# Patient Record
Sex: FEMALE | Race: WHITE | NOT HISPANIC OR LATINO | Employment: OTHER | URBAN - METROPOLITAN AREA
[De-identification: names, ages, dates, MRNs, and addresses within clinical notes are randomized per-mention and may not be internally consistent; named-entity substitution may affect disease eponyms.]

---

## 2023-03-08 PROBLEM — H69.93 DYSFUNCTION OF BOTH EUSTACHIAN TUBES: Status: ACTIVE | Noted: 2023-03-08

## 2023-03-08 PROBLEM — C18.9 COLON CANCER (MULTI): Status: ACTIVE | Noted: 2023-03-08

## 2023-03-08 PROBLEM — R53.81 MALAISE AND FATIGUE: Status: ACTIVE | Noted: 2023-03-08

## 2023-03-08 PROBLEM — M19.049 ARTHRITIS, DEGENERATIVE, LOCALIZED, PRIMARY, HAND: Status: ACTIVE | Noted: 2023-03-08

## 2023-03-08 PROBLEM — R42 DIZZINESS: Status: ACTIVE | Noted: 2023-03-08
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
## 2023-03-08 PROBLEM — M54.32 LEFT SIDED SCIATICA: Status: ACTIVE | Noted: 2023-03-08

## 2023-03-08 PROBLEM — R07.89 CHEST TIGHTNESS: Status: ACTIVE | Noted: 2023-03-08

## 2023-03-08 PROBLEM — J40 TRACHEOBRONCHITIS: Status: ACTIVE | Noted: 2023-03-08

## 2023-03-08 PROBLEM — I65.29 CAROTID STENOSIS: Status: ACTIVE | Noted: 2023-03-08

## 2023-03-08 PROBLEM — I25.10 CORONARY ARTERY DISEASE: Status: ACTIVE | Noted: 2023-03-08

## 2023-03-08 PROBLEM — R53.83 MALAISE AND FATIGUE: Status: ACTIVE | Noted: 2023-03-08

## 2023-03-08 PROBLEM — M46.1 SACROILIITIS (CMS-HCC): Status: ACTIVE | Noted: 2023-03-08

## 2023-03-08 PROBLEM — M54.16 LUMBAR RADICULITIS: Status: ACTIVE | Noted: 2023-03-08

## 2023-03-08 PROBLEM — G43.909 MIGRAINES: Status: ACTIVE | Noted: 2023-03-08

## 2023-03-08 PROBLEM — L21.9 SEBORRHEIC DERMATITIS: Status: ACTIVE | Noted: 2023-03-08

## 2023-03-08 PROBLEM — F41.0 PANIC ATTACKS: Status: ACTIVE | Noted: 2023-03-08

## 2023-03-08 PROBLEM — H91.90 HEARING DIFFICULTY: Status: ACTIVE | Noted: 2023-03-08

## 2023-03-08 PROBLEM — F03.918: Status: ACTIVE | Noted: 2023-03-08

## 2023-03-08 PROBLEM — J42 CHRONIC BRONCHITIS (MULTI): Status: ACTIVE | Noted: 2023-03-08

## 2023-03-08 PROBLEM — M51.379 DISC DEGENERATION, LUMBOSACRAL: Status: ACTIVE | Noted: 2023-03-08

## 2023-03-08 PROBLEM — F32.A DEPRESSION: Status: ACTIVE | Noted: 2023-03-08

## 2023-03-08 PROBLEM — R06.02 SHORTNESS OF BREATH: Status: ACTIVE | Noted: 2023-03-08

## 2023-03-08 PROBLEM — F03.90 DEMENTIA (MULTI): Status: ACTIVE | Noted: 2023-03-08

## 2023-03-08 PROBLEM — J02.9 PHARYNGITIS: Status: ACTIVE | Noted: 2023-03-08

## 2023-03-08 PROBLEM — F41.9 ANXIETY: Status: ACTIVE | Noted: 2023-03-08

## 2023-03-08 PROBLEM — M77.8 THUMB TENDONITIS: Status: ACTIVE | Noted: 2023-03-08

## 2023-03-08 PROBLEM — E78.5 HYPERLIPIDEMIA: Status: ACTIVE | Noted: 2023-03-08

## 2023-03-08 PROBLEM — R31.9 HEMATURIA: Status: ACTIVE | Noted: 2023-03-08

## 2023-03-08 PROBLEM — I71.40 AAA (ABDOMINAL AORTIC ANEURYSM) (CMS-HCC): Status: ACTIVE | Noted: 2023-03-08

## 2023-03-08 PROBLEM — R00.2 PALPITATIONS: Status: ACTIVE | Noted: 2023-03-08

## 2023-03-08 PROBLEM — E55.9 VITAMIN D DEFICIENCY: Status: ACTIVE | Noted: 2023-03-08

## 2023-03-08 PROBLEM — L25.5 RHUS DERMATITIS: Status: ACTIVE | Noted: 2023-03-08

## 2023-03-08 PROBLEM — S00.93XA HEAD CONTUSION: Status: ACTIVE | Noted: 2023-03-08

## 2023-03-08 PROBLEM — E03.9 HYPOTHYROIDISM: Status: ACTIVE | Noted: 2023-03-08

## 2023-03-08 PROBLEM — M51.37 DISC DEGENERATION, LUMBOSACRAL: Status: ACTIVE | Noted: 2023-03-08

## 2023-03-08 PROBLEM — R10.30 LOWER ABDOMINAL PAIN: Status: ACTIVE | Noted: 2023-03-08

## 2023-03-08 PROBLEM — K59.04 CHRONIC IDIOPATHIC CONSTIPATION: Status: ACTIVE | Noted: 2023-03-08

## 2023-03-08 PROBLEM — K50.10: Status: ACTIVE | Noted: 2023-03-08

## 2023-03-08 RX ORDER — LEVOTHYROXINE SODIUM 25 UG/1
1 TABLET ORAL
COMMUNITY
Start: 2021-11-30 | End: 2023-06-19

## 2023-03-08 RX ORDER — SUMATRIPTAN SUCCINATE 100 MG/1
100 TABLET ORAL
COMMUNITY

## 2023-03-08 RX ORDER — DONEPEZIL HYDROCHLORIDE 5 MG/1
1 TABLET, FILM COATED ORAL NIGHTLY
COMMUNITY
Start: 2022-09-06 | End: 2024-01-24 | Stop reason: SDUPTHER

## 2023-03-08 RX ORDER — ALPRAZOLAM 0.25 MG/1
0.25 TABLET ORAL DAILY PRN
COMMUNITY
Start: 2022-09-06 | End: 2023-03-09

## 2023-03-08 RX ORDER — FLUOXETINE 20 MG/1
20 TABLET ORAL DAILY
COMMUNITY
Start: 2022-09-06 | End: 2024-01-24 | Stop reason: ALTCHOICE

## 2023-03-08 RX ORDER — MEMANTINE HYDROCHLORIDE 10 MG/1
10 TABLET ORAL 2 TIMES DAILY
COMMUNITY
Start: 2022-08-15 | End: 2023-06-20 | Stop reason: ALTCHOICE

## 2023-03-08 RX ORDER — METOPROLOL SUCCINATE 50 MG/1
1 TABLET, EXTENDED RELEASE ORAL
COMMUNITY
Start: 2020-02-03 | End: 2023-03-14 | Stop reason: SDUPTHER

## 2023-03-09 DIAGNOSIS — F32.A DEPRESSION, UNSPECIFIED: ICD-10-CM

## 2023-03-09 DIAGNOSIS — F32.A ANXIETY AND DEPRESSION: Primary | ICD-10-CM

## 2023-03-09 DIAGNOSIS — F41.9 ANXIETY AND DEPRESSION: Primary | ICD-10-CM

## 2023-03-09 RX ORDER — FLUOXETINE HYDROCHLORIDE 20 MG/1
CAPSULE ORAL
Qty: 180 CAPSULE | Refills: 1 | Status: SHIPPED | OUTPATIENT
Start: 2023-03-09 | End: 2023-07-31

## 2023-03-09 RX ORDER — ALPRAZOLAM 0.25 MG/1
TABLET ORAL
Qty: 30 TABLET | Refills: 1 | Status: SHIPPED | OUTPATIENT
Start: 2023-03-09 | End: 2023-03-14 | Stop reason: SDUPTHER

## 2023-03-14 ENCOUNTER — OFFICE VISIT (OUTPATIENT)
Dept: PRIMARY CARE | Facility: CLINIC | Age: 78
End: 2023-03-14
Payer: MEDICARE

## 2023-03-14 VITALS
BODY MASS INDEX: 25.69 KG/M2 | TEMPERATURE: 98.3 F | HEART RATE: 47 BPM | DIASTOLIC BLOOD PRESSURE: 58 MMHG | WEIGHT: 145 LBS | SYSTOLIC BLOOD PRESSURE: 95 MMHG | RESPIRATION RATE: 16 BRPM | HEIGHT: 63 IN | OXYGEN SATURATION: 95 %

## 2023-03-14 DIAGNOSIS — J41.1 MUCOPURULENT CHRONIC BRONCHITIS (MULTI): Primary | ICD-10-CM

## 2023-03-14 DIAGNOSIS — Z51.81 THERAPEUTIC DRUG MONITORING: ICD-10-CM

## 2023-03-14 DIAGNOSIS — Z12.31 BREAST CANCER SCREENING BY MAMMOGRAM: ICD-10-CM

## 2023-03-14 DIAGNOSIS — F41.9 ANXIETY AND DEPRESSION: ICD-10-CM

## 2023-03-14 DIAGNOSIS — F32.A ANXIETY AND DEPRESSION: ICD-10-CM

## 2023-03-14 DIAGNOSIS — I10 PRIMARY HYPERTENSION: ICD-10-CM

## 2023-03-14 PROCEDURE — 3078F DIAST BP <80 MM HG: CPT | Performed by: FAMILY MEDICINE

## 2023-03-14 PROCEDURE — 3074F SYST BP LT 130 MM HG: CPT | Performed by: FAMILY MEDICINE

## 2023-03-14 PROCEDURE — 99214 OFFICE O/P EST MOD 30 MIN: CPT | Performed by: FAMILY MEDICINE

## 2023-03-14 RX ORDER — METOPROLOL SUCCINATE 50 MG/1
50 TABLET, EXTENDED RELEASE ORAL
Qty: 90 TABLET | Refills: 3 | Status: SHIPPED | OUTPATIENT
Start: 2023-03-14 | End: 2023-09-20 | Stop reason: ALTCHOICE

## 2023-03-14 RX ORDER — ALPRAZOLAM 0.25 MG/1
0.25 TABLET ORAL DAILY PRN
Qty: 30 TABLET | Refills: 0 | Status: SHIPPED | OUTPATIENT
Start: 2023-03-14 | End: 2023-06-19

## 2023-03-14 ASSESSMENT — PAIN SCALES - GENERAL: PAINLEVEL: 0-NO PAIN

## 2023-03-14 NOTE — PROGRESS NOTES
Subjective   Harper Diana is a 77 y.o. female who presents for Follow-up (Saw geriatric specialist at [facility] concerned about weight loss ).  HPI patient is a 77-year-old female who suffers with dementia, low blood pressure and weight loss.  She did see the geriatric psychiatrist a month ago and they wanted her to have a low-dose CT scan due to her tobacco usage and patient is also due for mammogram.  These will be ordered today.  Patient is doing better mentally and her daughter does call her twice a day to keep tabs on it.  She lives with her older brother but his health is not very good.  She also needs a refill on her medications today and will sign the drug contract and I will review the OARRS report.  She did give us a urine drug screen last September.      Objective   ROS10 systems were reviewed and the information is included in the HPI and no additional review of systems is indicated.  The    Physical Exam  Constitutional:       General: She is not in acute distress.     Appearance: Normal appearance. She is well-developed. She is not diaphoretic.   HENT:      Head: Normocephalic and atraumatic.      Right Ear: Tympanic membrane, ear canal and external ear normal.      Left Ear: Tympanic membrane, ear canal and external ear normal.      Nose: Congestion present.      Comments: Chronic sinus from tobacco       Mouth/Throat:      Mouth: Mucous membranes are dry.      Pharynx: Oropharynx is clear.   Eyes:      General: No scleral icterus.     Extraocular Movements: Extraocular movements intact.      Conjunctiva/sclera: Conjunctivae normal.      Pupils: Pupils are equal, round, and reactive to light.   Neck:      Vascular: No JVD.      Comments: Patient has mild thyromegaly chronic hypothyroidism.  She will need another ultrasound of her thyroid.  Cardiovascular:      Rate and Rhythm: Normal rate and regular rhythm.      Pulses: Normal pulses.      Heart sounds: Normal heart sounds. No murmur heard.     No  friction rub. No gallop.      Comments: Patient takes Toprol-XL 50 mg daily and this will be decreased to 25 daily due to the low blood pressure.  Pulmonary:      Effort: Pulmonary effort is normal. No respiratory distress.      Breath sounds: Rhonchi (Scattered rhonchi from tobacco use.) present. No wheezing (Expiratory wheezes.  Tobacco usage) or rales.      Comments: Patient has bilateral loose rhonchi and expiratory wheezing from tobacco use.  Chest:      Chest wall: No tenderness.   Abdominal:      General: Bowel sounds are normal. There is no distension.      Palpations: Abdomen is soft. There is no mass.      Tenderness: There is no abdominal tenderness. There is no rebound.      Comments: Patient has a gallstone from previous CT scan.  Asymptomatic.     She also has a had no monitor kidney that was evaluated many years ago.  History of treated   colon cancer   Genitourinary:     Comments: Not examined  Musculoskeletal:         General: Normal range of motion.      Cervical back: Normal range of motion and neck supple. No tenderness.   Skin:     General: Skin is warm and dry.   Neurological:      General: No focal deficit present.      Mental Status: She is alert and oriented to person, place, and time.      Deep Tendon Reflexes: Reflexes normal.   Psychiatric:         Behavior: Behavior normal.         Thought Content: Thought content normal.         Judgment: Judgment normal.      Comments: Patient has  chronic anxiety.  Needs refill on Alprazolam.  Chronic anxiety and nervousness.  Stable dementia.         Assessment/Plan   Patient was evaluated with her daughter present for her dementia, weight loss and low blood pressure.  Her Toprol will be decreased to 1/2 pill daily.  Hopefully this will help her blood pressure and she is mildly dehydrated and needs to drink more water.  Patient will give us a urine for drug screen for her alprazolam refill.  I did review her OARRS report and she did complete the  contract for benzodiazepines.  Patient otherwise was scheduled for mammograms and a low-dose lung CT.  She will follow-up in 3 months or sooner as needed.  He is also seeing the geriatric psychiatrist.  She does show some improvement and her daughter calls her twice a day to keep tabs on her.  Mental status is stable.  Problem List Items Addressed This Visit    None  Visit Diagnoses       Anxiety and depression                     Bassem Landaverde, DO

## 2023-06-19 DIAGNOSIS — R05.3 CHRONIC COUGHING: Primary | ICD-10-CM

## 2023-06-19 DIAGNOSIS — F32.A ANXIETY AND DEPRESSION: ICD-10-CM

## 2023-06-19 DIAGNOSIS — J44.9 CHRONIC OBSTRUCTIVE PULMONARY DISEASE, UNSPECIFIED COPD TYPE (MULTI): ICD-10-CM

## 2023-06-19 DIAGNOSIS — E07.9 DISORDER OF THYROID, UNSPECIFIED: ICD-10-CM

## 2023-06-19 DIAGNOSIS — F41.9 ANXIETY AND DEPRESSION: ICD-10-CM

## 2023-06-19 RX ORDER — LEVOTHYROXINE SODIUM 25 UG/1
TABLET ORAL
Qty: 90 TABLET | Refills: 1 | Status: SHIPPED | OUTPATIENT
Start: 2023-06-19 | End: 2023-08-08 | Stop reason: SDUPTHER

## 2023-06-19 RX ORDER — ALPRAZOLAM 0.25 MG/1
TABLET ORAL
Qty: 30 TABLET | Refills: 1 | Status: SHIPPED | OUTPATIENT
Start: 2023-06-19 | End: 2023-08-23

## 2023-06-20 ENCOUNTER — OFFICE VISIT (OUTPATIENT)
Dept: PRIMARY CARE | Facility: CLINIC | Age: 78
End: 2023-06-20
Payer: MEDICARE

## 2023-06-20 VITALS
SYSTOLIC BLOOD PRESSURE: 113 MMHG | HEIGHT: 63 IN | TEMPERATURE: 98.1 F | HEART RATE: 55 BPM | DIASTOLIC BLOOD PRESSURE: 72 MMHG | OXYGEN SATURATION: 98 % | RESPIRATION RATE: 14 BRPM | BODY MASS INDEX: 24.27 KG/M2 | WEIGHT: 137 LBS

## 2023-06-20 DIAGNOSIS — I63.81 MULTIPLE LACUNAR INFARCTS (MULTI): ICD-10-CM

## 2023-06-20 DIAGNOSIS — F41.9 ANXIETY: ICD-10-CM

## 2023-06-20 DIAGNOSIS — R53.83 MALAISE AND FATIGUE: ICD-10-CM

## 2023-06-20 DIAGNOSIS — E78.2 MIXED HYPERLIPIDEMIA: ICD-10-CM

## 2023-06-20 DIAGNOSIS — E03.9 ACQUIRED HYPOTHYROIDISM: ICD-10-CM

## 2023-06-20 DIAGNOSIS — F03.918 BEHAVIORAL AND PSYCHOLOGICAL SYMPTOMS OF DEMENTIA (MULTI): ICD-10-CM

## 2023-06-20 DIAGNOSIS — M54.16 LUMBAR RADICULITIS: ICD-10-CM

## 2023-06-20 DIAGNOSIS — F32.4 MAJOR DEPRESSIVE DISORDER IN PARTIAL REMISSION, UNSPECIFIED WHETHER RECURRENT (CMS-HCC): Primary | ICD-10-CM

## 2023-06-20 DIAGNOSIS — I71.40 ABDOMINAL AORTIC ANEURYSM (AAA), UNSPECIFIED PART, UNSPECIFIED WHETHER RUPTURED (CMS-HCC): ICD-10-CM

## 2023-06-20 DIAGNOSIS — R53.81 MALAISE AND FATIGUE: ICD-10-CM

## 2023-06-20 DIAGNOSIS — M46.1 SACROILIITIS (CMS-HCC): ICD-10-CM

## 2023-06-20 LAB
ALANINE AMINOTRANSFERASE (SGPT) (U/L) IN SER/PLAS: 11 U/L (ref 7–45)
ALBUMIN (G/DL) IN SER/PLAS: 4.4 G/DL (ref 3.4–5)
ALKALINE PHOSPHATASE (U/L) IN SER/PLAS: 73 U/L (ref 33–136)
ANION GAP IN SER/PLAS: 14 MMOL/L (ref 10–20)
ASPARTATE AMINOTRANSFERASE (SGOT) (U/L) IN SER/PLAS: 16 U/L (ref 9–39)
BILIRUBIN TOTAL (MG/DL) IN SER/PLAS: 0.7 MG/DL (ref 0–1.2)
CALCIUM (MG/DL) IN SER/PLAS: 9.2 MG/DL (ref 8.6–10.6)
CARBON DIOXIDE, TOTAL (MMOL/L) IN SER/PLAS: 28 MMOL/L (ref 21–32)
CHLORIDE (MMOL/L) IN SER/PLAS: 108 MMOL/L (ref 98–107)
CHOLESTEROL (MG/DL) IN SER/PLAS: 173 MG/DL (ref 0–199)
CHOLESTEROL IN HDL (MG/DL) IN SER/PLAS: 45.5 MG/DL
CHOLESTEROL/HDL RATIO: 3.8
CREATININE (MG/DL) IN SER/PLAS: 0.78 MG/DL (ref 0.5–1.05)
ERYTHROCYTE DISTRIBUTION WIDTH (RATIO) BY AUTOMATED COUNT: 12.8 % (ref 11.5–14.5)
ERYTHROCYTE MEAN CORPUSCULAR HEMOGLOBIN CONCENTRATION (G/DL) BY AUTOMATED: 31.5 G/DL (ref 32–36)
ERYTHROCYTE MEAN CORPUSCULAR VOLUME (FL) BY AUTOMATED COUNT: 108 FL (ref 80–100)
ERYTHROCYTES (10*6/UL) IN BLOOD BY AUTOMATED COUNT: 3.99 X10E12/L (ref 4–5.2)
GFR FEMALE: 78 ML/MIN/1.73M2
GLUCOSE (MG/DL) IN SER/PLAS: 85 MG/DL (ref 74–99)
HEMATOCRIT (%) IN BLOOD BY AUTOMATED COUNT: 42.9 % (ref 36–46)
HEMOGLOBIN (G/DL) IN BLOOD: 13.5 G/DL (ref 12–16)
LDL: 110 MG/DL (ref 0–99)
LEUKOCYTES (10*3/UL) IN BLOOD BY AUTOMATED COUNT: 6.1 X10E9/L (ref 4.4–11.3)
NRBC (PER 100 WBCS) BY AUTOMATED COUNT: 0 /100 WBC (ref 0–0)
PLATELETS (10*3/UL) IN BLOOD AUTOMATED COUNT: 177 X10E9/L (ref 150–450)
POTASSIUM (MMOL/L) IN SER/PLAS: 4.7 MMOL/L (ref 3.5–5.3)
PROTEIN TOTAL: 6.7 G/DL (ref 6.4–8.2)
SODIUM (MMOL/L) IN SER/PLAS: 145 MMOL/L (ref 136–145)
TRIGLYCERIDE (MG/DL) IN SER/PLAS: 90 MG/DL (ref 0–149)
UREA NITROGEN (MG/DL) IN SER/PLAS: 23 MG/DL (ref 6–23)
VLDL: 18 MG/DL (ref 0–40)

## 2023-06-20 PROCEDURE — 80061 LIPID PANEL: CPT

## 2023-06-20 PROCEDURE — 80053 COMPREHEN METABOLIC PANEL: CPT

## 2023-06-20 PROCEDURE — 99214 OFFICE O/P EST MOD 30 MIN: CPT | Performed by: FAMILY MEDICINE

## 2023-06-20 PROCEDURE — 85027 COMPLETE CBC AUTOMATED: CPT

## 2023-06-20 PROCEDURE — 1159F MED LIST DOCD IN RCRD: CPT | Performed by: FAMILY MEDICINE

## 2023-06-20 PROCEDURE — 1160F RVW MEDS BY RX/DR IN RCRD: CPT | Performed by: FAMILY MEDICINE

## 2023-06-20 PROCEDURE — 84443 ASSAY THYROID STIM HORMONE: CPT

## 2023-06-20 ASSESSMENT — PAIN SCALES - GENERAL: PAINLEVEL: 0-NO PAIN

## 2023-06-20 NOTE — PROGRESS NOTES
Subjective   Harper Diana is a 78 y.o. female who presents for Follow-up.  Weight loss  /  Dementia    HPI : Patient is a 78-year-old female who suffers with vascular dementia and recent weight loss.  She is with her daughter who is medical power of  and looks after her mother.  Patient lives with her brother however and he was recently in the hospital and nursing home for approximately 5 weeks due to cellulitis of his legs.  The patient is actually doing much better since her 1 medication Namenda had been discontinued.   Patient will have her blood work rechecked today to further investigate her weight loss.  She also is scheduled to have a CT scan in the next 2 weeks and she does continue to smoke tobacco approximately a pack per day.  Her daughter and I have encouraged her to try to quit tobacco but we have not had much luck.  Patient does also follow with a neurologist and has an appointment in a month for follow-up.      Objective  :ROS  ;10 systems were reviewed and the information is included in the HPI and no additional review of systems is indicated.    Physical Exam  Vitals and nursing note reviewed.   Constitutional:       Appearance: Normal appearance.      Comments: Patient is much more alert and oriented today since her Namenda was discontinued.  She does not seem confused but she does have episodic dementia.   HENT:      Head: Normocephalic.      Right Ear: Tympanic membrane and ear canal normal.      Left Ear: Tympanic membrane and ear canal normal.      Nose: Nose normal.      Mouth/Throat:      Mouth: Mucous membranes are moist.      Pharynx: Oropharynx is clear.      Comments: Mouth is moist, tongue is midline, no posterior pharyngeal erythema.  Eyes:      Extraocular Movements: Extraocular movements intact.      Conjunctiva/sclera: Conjunctivae normal.      Pupils: Pupils are equal, round, and reactive to light.      Comments: Patient denies any blurred or double vision and does have  her eyes checked yearly.   Neck:      Comments: Patient has restriction of motion due to spasm and arthritis.  Denies any thyromegaly or cervical adenopathy.  Cardiovascular:      Rate and Rhythm: Normal rate and regular rhythm.      Heart sounds: Normal heart sounds.      Comments: Denies any chest pain or palpitations.  Heart rhythm is stable S1 and S2 are noted and no murmurs.  Pulmonary:      Breath sounds: Rhonchi present.      Comments: Patient is a smoker and has chronic bronchitis.  She has expiratory rhonchi but no wheezing today.  Normal inspiration and expiration.  Abdominal:      General: Bowel sounds are normal.      Palpations: Abdomen is soft.      Comments: Abdomen is soft and nontender.  Patient denies any guarding or rebound tenderness.  She does have a history of previous colon cancer with resection.  Denies any flank tenderness.   Genitourinary:     Comments: Patient denies dysuria, no hematuria, no flank tenderness.  Patient does have occasional nocturia.  Musculoskeletal:         General: Tenderness present.      Cervical back: Normal range of motion.      Comments: Patient does have arthritis in her lower back lumbar region, occasionally has sciatica which is currently been stable.  Does have some peripheral radicular pain down the legs.  Also has osteoarthritis of the fingers.  DJD of the knees.  No ankle edema.   Skin:     General: Skin is warm and dry.      Comments: Patient's skin is warm and dry, no bruising or skin lesions noted.  No rashes.   Neurological:      General: No focal deficit present.      Mental Status: She is alert and oriented to person, place, and time. Mental status is at baseline.      Comments: Patient's MRI of the brain does show some lacunar infarcts.  She does have vascular dementia and occasionally has episodes of forgetfulness and depression.  She actually has been much better since the Namenda was discontinued and she is only on Aricept.  Some peripheral  radicular pain down the legs from lumbosacral disc disease.  Patient is baseline from her lacunar infarcts.  Gait is stable.   Psychiatric:         Mood and Affect: Mood normal.         Behavior: Behavior normal.         Thought Content: Thought content normal.         Judgment: Judgment normal.      Comments: Patient does vary day-to-day but today her mood is stable and she does not seem anxious.  Thought content and judgment are very good today and she is very talkative.  Behavior is stable today.  Dementia is very stable today.     PLAN  : Patient is a 78-year-old female who is in the office with her daughter for recheck on blood work and also review of medications.  Her dementia and mood are very stable today.  Patient has also lost some weight and I will review her blood results from today and further recommendations will be made tomorrow.  We had a long talk with the patient's daughter and the patient regarding her staying with the son who just got out of the hospital.  Patient states she is okay with living with the son and they have no major conflicts.  Patient will follow-up in 3 months or sooner pending the blood work results.    Problem List Items Addressed This Visit       AAA (abdominal aortic aneurysm) (CMS/HCC)    Relevant Orders    CBC    Comprehensive Metabolic Panel    Lipid Panel    Thyroid Stimulating Hormone    Anxiety    Relevant Orders    CBC    Comprehensive Metabolic Panel    Lipid Panel    Thyroid Stimulating Hormone    Behavioral and psychological symptoms of dementia (CMS/HCC)    Depression - Primary    Hyperlipidemia    Relevant Orders    CBC    Comprehensive Metabolic Panel    Lipid Panel    Thyroid Stimulating Hormone    Hypothyroidism    Relevant Orders    CBC    Comprehensive Metabolic Panel    Lipid Panel    Thyroid Stimulating Hormone    Lumbar radiculitis    Malaise and fatigue    Relevant Orders    CBC    Comprehensive Metabolic Panel    Lipid Panel    Thyroid Stimulating Hormone     Sacroiliitis (CMS/HCC)            Bassem Landaverde DO

## 2023-06-21 LAB — THYROTROPIN (MIU/L) IN SER/PLAS BY DETECTION LIMIT <= 0.05 MIU/L: 3.54 MIU/L (ref 0.44–3.98)

## 2023-06-23 ENCOUNTER — TELEPHONE (OUTPATIENT)
Dept: PRIMARY CARE | Facility: CLINIC | Age: 78
End: 2023-06-23
Payer: MEDICARE

## 2023-06-23 DIAGNOSIS — Z12.31 ENCOUNTER FOR SCREENING MAMMOGRAM FOR MALIGNANT NEOPLASM OF BREAST: ICD-10-CM

## 2023-06-23 DIAGNOSIS — Z12.39 BREAST SCREENING: ICD-10-CM

## 2023-06-23 NOTE — TELEPHONE ENCOUNTER
Patient daughter called and states just a new order for a mammogram and standard chest cat scan need to be put in so she can have these scheduled.   I put the order in for a mammogram I just need you to put the order in for the standard chest cat scan. Patient has these appointments scheduled for 06/27/23.

## 2023-06-23 NOTE — RESULT ENCOUNTER NOTE
Blood sugar, kidney and liver function are normal    cholesterol was normal at 173       triglycerides are normal at 90 ,    much better than last time     white blood cell count is normal     red blood cell count is stable.        Thyroid function is stable        blood work looks stable      keep up the good work           her CAT scan of the chest and mammogram have been ordered

## 2023-06-28 ENCOUNTER — TELEPHONE (OUTPATIENT)
Dept: PRIMARY CARE | Facility: CLINIC | Age: 78
End: 2023-06-28
Payer: MEDICARE

## 2023-06-28 NOTE — TELEPHONE ENCOUNTER
----- Message from Bassem Landaverde DO sent at 6/28/2023  8:30 AM EDT -----  The   mammograms are stable.    She can repeat in 1 year.

## 2023-06-30 ENCOUNTER — TELEPHONE (OUTPATIENT)
Dept: PRIMARY CARE | Facility: CLINIC | Age: 78
End: 2023-06-30
Payer: MEDICARE

## 2023-06-30 NOTE — TELEPHONE ENCOUNTER
----- Message from Bassem Landaverde DO sent at 6/29/2023  5:00 PM EDT -----  The CAT scan shows scarring in both lungs and emphysema from smoking.      She has approximately 3 small scattered nodules which are most likely benign and   non cancerous  she really should stop smoking   .      She could repeat the CAT scan in 1 year if needed.

## 2023-07-29 DIAGNOSIS — F32.A DEPRESSION, UNSPECIFIED: ICD-10-CM

## 2023-07-31 RX ORDER — FLUOXETINE HYDROCHLORIDE 20 MG/1
CAPSULE ORAL
Qty: 180 CAPSULE | Refills: 1 | Status: SHIPPED | OUTPATIENT
Start: 2023-07-31 | End: 2024-01-08

## 2023-08-08 DIAGNOSIS — E07.9 DISORDER OF THYROID, UNSPECIFIED: ICD-10-CM

## 2023-08-08 RX ORDER — LEVOTHYROXINE SODIUM 25 UG/1
25 TABLET ORAL DAILY
Qty: 90 TABLET | Refills: 3 | Status: SHIPPED | OUTPATIENT
Start: 2023-08-08

## 2023-08-22 DIAGNOSIS — F32.A ANXIETY AND DEPRESSION: ICD-10-CM

## 2023-08-22 DIAGNOSIS — F41.9 ANXIETY AND DEPRESSION: ICD-10-CM

## 2023-08-23 RX ORDER — ALPRAZOLAM 0.25 MG/1
TABLET ORAL
Qty: 30 TABLET | Refills: 1 | Status: SHIPPED | OUTPATIENT
Start: 2023-08-23 | End: 2023-11-10

## 2023-09-20 ENCOUNTER — OFFICE VISIT (OUTPATIENT)
Dept: PRIMARY CARE | Facility: CLINIC | Age: 78
End: 2023-09-20
Payer: MEDICARE

## 2023-09-20 VITALS
HEART RATE: 64 BPM | SYSTOLIC BLOOD PRESSURE: 141 MMHG | TEMPERATURE: 98.4 F | RESPIRATION RATE: 14 BRPM | HEIGHT: 63 IN | OXYGEN SATURATION: 94 % | DIASTOLIC BLOOD PRESSURE: 76 MMHG | BODY MASS INDEX: 26.05 KG/M2 | WEIGHT: 147 LBS

## 2023-09-20 DIAGNOSIS — F03.918 BEHAVIORAL AND PSYCHOLOGICAL SYMPTOMS OF DEMENTIA (MULTI): ICD-10-CM

## 2023-09-20 DIAGNOSIS — F41.9 ANXIETY: ICD-10-CM

## 2023-09-20 DIAGNOSIS — Z23 NEEDS FLU SHOT: ICD-10-CM

## 2023-09-20 DIAGNOSIS — F41.0 PANIC ATTACKS: ICD-10-CM

## 2023-09-20 DIAGNOSIS — E03.9 ACQUIRED HYPOTHYROIDISM: Primary | ICD-10-CM

## 2023-09-20 DIAGNOSIS — M51.37 DISC DEGENERATION, LUMBOSACRAL: ICD-10-CM

## 2023-09-20 DIAGNOSIS — F34.1 PERSISTENT DEPRESSIVE DISORDER: ICD-10-CM

## 2023-09-20 DIAGNOSIS — I63.81 MULTIPLE LACUNAR INFARCTS (MULTI): ICD-10-CM

## 2023-09-20 DIAGNOSIS — I10 PRIMARY HYPERTENSION: ICD-10-CM

## 2023-09-20 PROCEDURE — 1159F MED LIST DOCD IN RCRD: CPT | Performed by: FAMILY MEDICINE

## 2023-09-20 PROCEDURE — 90662 IIV NO PRSV INCREASED AG IM: CPT | Performed by: FAMILY MEDICINE

## 2023-09-20 PROCEDURE — 3078F DIAST BP <80 MM HG: CPT | Performed by: FAMILY MEDICINE

## 2023-09-20 PROCEDURE — 99214 OFFICE O/P EST MOD 30 MIN: CPT | Performed by: FAMILY MEDICINE

## 2023-09-20 PROCEDURE — 1126F AMNT PAIN NOTED NONE PRSNT: CPT | Performed by: FAMILY MEDICINE

## 2023-09-20 PROCEDURE — 3077F SYST BP >= 140 MM HG: CPT | Performed by: FAMILY MEDICINE

## 2023-09-20 PROCEDURE — 1160F RVW MEDS BY RX/DR IN RCRD: CPT | Performed by: FAMILY MEDICINE

## 2023-09-20 PROCEDURE — G0008 ADMIN INFLUENZA VIRUS VAC: HCPCS | Performed by: FAMILY MEDICINE

## 2023-09-20 RX ORDER — METOPROLOL SUCCINATE 25 MG/1
25 TABLET, EXTENDED RELEASE ORAL DAILY
Qty: 30 TABLET | Refills: 5 | Status: SHIPPED | OUTPATIENT
Start: 2023-09-20 | End: 2023-10-04

## 2023-09-20 ASSESSMENT — PAIN SCALES - GENERAL: PAINLEVEL: 0-NO PAIN

## 2023-09-20 NOTE — PROGRESS NOTES
Subjective   Harper Diana is a 78 y.o. female who presents for Follow-up (Follow up visit, blood pressure check).     HPI  : Patient is a 78-year-old female who was brought in by her daughter due to dementia, anxiety and depression, and recheck on blood pressure and medication review.  She is also seeing a dementia specialist at the [facility] and they restarted her Prozac 20 mg daily and also restarted her Aricept 5 mg daily.  The patient still does take an alprazolam almost once a day due to anxiety and panic but her mental status has stabilized and seems much better today.  The patient is also eating better and her weight has improved.  She has however still smoking and states she has cut down on cigarettes but cannot quit.  She has a phone appointment with the dementia doctor in 1 month and they will consider increasing her Aricept at that time.  Patient does seem very stable today and alert and oriented.  She still does however have intermittent confusion and panic attacks.      Objective  : ROS : 10 systems were reviewed and the information is included in the HPI and no additional review of systems is indicated.    Physical Exam  Vitals and nursing note reviewed.   Constitutional:       Appearance: Normal appearance.      Comments: Patient is alert and oriented x3.   No acute distress, but does have dementia and depression.   HENT:      Head: Normocephalic.      Right Ear: Tympanic membrane and external ear normal.      Left Ear: Tympanic membrane and external ear normal.      Ears:      Comments: Ears are patent bilaterally and TMs are clear.     Nose: Nose normal.      Mouth/Throat:      Mouth: Mucous membranes are moist.      Pharynx: Oropharynx is clear.      Comments: Mouth is moist, tongue is midline.  No posterior pharyngeal erythema.  Eyes:      Extraocular Movements: Extraocular movements intact.      Conjunctiva/sclera: Conjunctivae normal.      Pupils: Pupils are equal, round, and reactive  to light.      Comments: No visual disturbance, does have her eyes examined once a year.   Neck:      Comments: No carotid bruits, no thyromegaly, no cervical adenopathy.  Occasional neck spasm and restriction of motion secondary to stress and tension.  Cardiovascular:      Rate and Rhythm: Normal rate and regular rhythm.      Pulses: Normal pulses.      Heart sounds: Normal heart sounds.      Comments: Patient denies chest pain and no palpitations.  Heart rhythm is stable S1 and S2 are noted, no ectopics.  Pulmonary:      Effort: Pulmonary effort is normal.      Comments: Patient denies any coughing or wheezing.  Lungs are clear to auscultation.    Abdominal:      General: Abdomen is flat. Bowel sounds are normal.      Palpations: Abdomen is soft.      Comments: Abdomen is soft and nontender, no hepatosplenomegaly.  No flank tenderness.  No suprapubic pain.  Positive bowel sounds x4.  No abdominal guarding and no rebound tenderness.   Genitourinary:     Comments: Patient denies dysuria, no hematuria, no nocturia, denies flank pain.  Musculoskeletal:         General: Tenderness present.      Cervical back: Normal range of motion.      Comments: Patient has lumbosacral disc disease and chronic back pain with some difficulty ambulating.  Age-related arthritis in the joints.  Moderate  restriction of motion cervical and lumbar spines due to arthritis and muscle spasm.  Does ambulate without any assistive device.    Skin:     General: Skin is warm.      Coloration: Skin is pale.      Comments: There is no bruising, no erythema, no skin lesions noted, no rashes.   Neurological:      Mental Status: She is alert and oriented to person, place, and time. Mental status is at baseline.      Sensory: Sensory deficit present.      Comments: Patient does have dementia along with chronic depression and panic attacks.  She is doing much better since she is back on the Prozac and the Aricept.  She does follow with a dementia  specialist at the [facility].  No focal neurosensory deficits are noted.  She does have some paresthesias in her legs from chronic back trouble.  Coordination and gait are stable.  Normal muscle strength upper and lower extremities.   Psychiatric:         Behavior: Behavior normal.      Comments: Patient does live with her son and the daughter does live separately but helps supervise her care.  The daughter brought her in today and the patient is a little depressed but does seem to be improving with the Prozac.  Behavior is also stable with the Aricept but she does have intermittent panic attacks.  She relies on the daughter for decision making.     PLAN : Patient is a 78-year-old female with depression, anxiety and dementia.  She was reevaluated today with her daughter present.  Patient is also seeing a specialist at the [facility] who did restart her Prozac and Aricept.  Patient seems much more stable today and only takes the alprazolam as needed.  She does not need blood work today and we will check her in 4 months and recheck her blood results at that time.  Patient also is eating better and has cut down on her tobacco use.  We did review her recent CT scan of the chest and she will need to repeat that again next June.  Otherwise she is stable and will follow-up in January.   patient did previously have the Prevnar 20 last year and did receive her high-dose flu shot today.    Problem List Items Addressed This Visit       Disc degeneration, lumbosacral    Relevant Orders    Disability Placard     Other Visit Diagnoses       Needs flu shot                     Bassem Landaverde DO

## 2023-10-04 DIAGNOSIS — I10 PRIMARY HYPERTENSION: ICD-10-CM

## 2023-10-04 RX ORDER — METOPROLOL SUCCINATE 25 MG/1
25 TABLET, EXTENDED RELEASE ORAL DAILY
Qty: 90 TABLET | Refills: 2 | Status: SHIPPED | OUTPATIENT
Start: 2023-10-04 | End: 2024-06-06

## 2023-11-10 DIAGNOSIS — F32.A ANXIETY AND DEPRESSION: ICD-10-CM

## 2023-11-10 DIAGNOSIS — F41.9 ANXIETY AND DEPRESSION: ICD-10-CM

## 2023-11-10 RX ORDER — ALPRAZOLAM 0.25 MG/1
TABLET ORAL
Qty: 30 TABLET | Refills: 1 | Status: SHIPPED | OUTPATIENT
Start: 2023-11-10 | End: 2024-01-12

## 2024-01-06 DIAGNOSIS — F32.A DEPRESSION, UNSPECIFIED: ICD-10-CM

## 2024-01-08 RX ORDER — FLUOXETINE HYDROCHLORIDE 20 MG/1
CAPSULE ORAL
Qty: 180 CAPSULE | Refills: 1 | Status: SHIPPED | OUTPATIENT
Start: 2024-01-08 | End: 2024-01-24 | Stop reason: SDUPTHER

## 2024-01-11 DIAGNOSIS — F32.A ANXIETY AND DEPRESSION: ICD-10-CM

## 2024-01-11 DIAGNOSIS — F41.9 ANXIETY AND DEPRESSION: ICD-10-CM

## 2024-01-12 RX ORDER — ALPRAZOLAM 0.25 MG/1
TABLET ORAL
Qty: 30 TABLET | Refills: 1 | Status: SHIPPED | OUTPATIENT
Start: 2024-01-12 | End: 2024-04-10

## 2024-01-16 ENCOUNTER — APPOINTMENT (OUTPATIENT)
Dept: PRIMARY CARE | Facility: CLINIC | Age: 79
End: 2024-01-16
Payer: MEDICARE

## 2024-01-24 ENCOUNTER — OFFICE VISIT (OUTPATIENT)
Dept: PRIMARY CARE | Facility: CLINIC | Age: 79
End: 2024-01-24
Payer: MEDICARE

## 2024-01-24 VITALS
HEART RATE: 72 BPM | BODY MASS INDEX: 26.19 KG/M2 | WEIGHT: 147.8 LBS | DIASTOLIC BLOOD PRESSURE: 72 MMHG | OXYGEN SATURATION: 95 % | HEIGHT: 63 IN | TEMPERATURE: 97 F | RESPIRATION RATE: 14 BRPM | SYSTOLIC BLOOD PRESSURE: 126 MMHG

## 2024-01-24 DIAGNOSIS — I71.40 ABDOMINAL AORTIC ANEURYSM (AAA), UNSPECIFIED PART, UNSPECIFIED WHETHER RUPTURED (CMS-HCC): ICD-10-CM

## 2024-01-24 DIAGNOSIS — F01.B4 MODERATE VASCULAR DEMENTIA WITH ANXIETY (MULTI): ICD-10-CM

## 2024-01-24 DIAGNOSIS — E44.1 MILD PROTEIN-CALORIE MALNUTRITION (MULTI): ICD-10-CM

## 2024-01-24 DIAGNOSIS — R73.9 HYPERGLYCEMIA: ICD-10-CM

## 2024-01-24 DIAGNOSIS — G30.8: ICD-10-CM

## 2024-01-24 DIAGNOSIS — E78.2 MIXED HYPERLIPIDEMIA: ICD-10-CM

## 2024-01-24 DIAGNOSIS — F02.A4: ICD-10-CM

## 2024-01-24 DIAGNOSIS — C18.7 MALIGNANT NEOPLASM OF SIGMOID COLON (MULTI): ICD-10-CM

## 2024-01-24 DIAGNOSIS — Z00.00 ROUTINE GENERAL MEDICAL EXAMINATION AT HEALTH CARE FACILITY: ICD-10-CM

## 2024-01-24 DIAGNOSIS — M54.16 LUMBAR RADICULITIS: ICD-10-CM

## 2024-01-24 DIAGNOSIS — E55.9 VITAMIN D DEFICIENCY: ICD-10-CM

## 2024-01-24 DIAGNOSIS — F41.0 PANIC ATTACKS: ICD-10-CM

## 2024-01-24 DIAGNOSIS — F41.9 ANXIETY: ICD-10-CM

## 2024-01-24 DIAGNOSIS — J41.1 MUCOPURULENT CHRONIC BRONCHITIS (MULTI): ICD-10-CM

## 2024-01-24 DIAGNOSIS — F32.A DEPRESSION, UNSPECIFIED: ICD-10-CM

## 2024-01-24 DIAGNOSIS — I10 PRIMARY HYPERTENSION: ICD-10-CM

## 2024-01-24 DIAGNOSIS — M46.1 SACROILIITIS (CMS-HCC): ICD-10-CM

## 2024-01-24 DIAGNOSIS — K50.10 CROHN'S DISEASE OF COLON WITHOUT COMPLICATION (MULTI): ICD-10-CM

## 2024-01-24 DIAGNOSIS — E03.9 ACQUIRED HYPOTHYROIDISM: ICD-10-CM

## 2024-01-24 DIAGNOSIS — Z00.00 MEDICARE ANNUAL WELLNESS VISIT, SUBSEQUENT: Primary | ICD-10-CM

## 2024-01-24 DIAGNOSIS — F32.4 MAJOR DEPRESSIVE DISORDER IN PARTIAL REMISSION, UNSPECIFIED WHETHER RECURRENT (CMS-HCC): ICD-10-CM

## 2024-01-24 LAB
25(OH)D3 SERPL-MCNC: 33 NG/ML (ref 30–100)
ALBUMIN SERPL BCP-MCNC: 4.4 G/DL (ref 3.4–5)
ALP SERPL-CCNC: 64 U/L (ref 33–136)
ALT SERPL W P-5'-P-CCNC: 10 U/L (ref 7–45)
ANION GAP SERPL CALC-SCNC: 16 MMOL/L (ref 10–20)
AST SERPL W P-5'-P-CCNC: 16 U/L (ref 9–39)
BILIRUB SERPL-MCNC: 0.9 MG/DL (ref 0–1.2)
BUN SERPL-MCNC: 14 MG/DL (ref 6–23)
CALCIUM SERPL-MCNC: 9.4 MG/DL (ref 8.6–10.6)
CHLORIDE SERPL-SCNC: 106 MMOL/L (ref 98–107)
CHOLEST SERPL-MCNC: 175 MG/DL (ref 0–199)
CHOLESTEROL/HDL RATIO: 3.8
CO2 SERPL-SCNC: 23 MMOL/L (ref 21–32)
CREAT SERPL-MCNC: 0.89 MG/DL (ref 0.5–1.05)
EGFRCR SERPLBLD CKD-EPI 2021: 66 ML/MIN/1.73M*2
ERYTHROCYTE [DISTWIDTH] IN BLOOD BY AUTOMATED COUNT: 13.1 % (ref 11.5–14.5)
GLUCOSE SERPL-MCNC: 87 MG/DL (ref 74–99)
HCT VFR BLD AUTO: 43.1 % (ref 36–46)
HDLC SERPL-MCNC: 46.3 MG/DL
HGB BLD-MCNC: 14.6 G/DL (ref 12–16)
LDLC SERPL CALC-MCNC: 96 MG/DL
MCH RBC QN AUTO: 35.1 PG (ref 26–34)
MCHC RBC AUTO-ENTMCNC: 33.9 G/DL (ref 32–36)
MCV RBC AUTO: 104 FL (ref 80–100)
NON HDL CHOLESTEROL: 129 MG/DL (ref 0–149)
NRBC BLD-RTO: 0 /100 WBCS (ref 0–0)
PLATELET # BLD AUTO: 219 X10*3/UL (ref 150–450)
POC FINGERSTICK BLOOD GLUCOSE: 111 MG/DL (ref 70–100)
POTASSIUM SERPL-SCNC: 4.4 MMOL/L (ref 3.5–5.3)
PROT SERPL-MCNC: 6.8 G/DL (ref 6.4–8.2)
RBC # BLD AUTO: 4.16 X10*6/UL (ref 4–5.2)
SODIUM SERPL-SCNC: 141 MMOL/L (ref 136–145)
TRIGL SERPL-MCNC: 164 MG/DL (ref 0–149)
TSH SERPL-ACNC: 4.39 MIU/L (ref 0.44–3.98)
VLDL: 33 MG/DL (ref 0–40)
WBC # BLD AUTO: 9 X10*3/UL (ref 4.4–11.3)

## 2024-01-24 PROCEDURE — 3074F SYST BP LT 130 MM HG: CPT | Performed by: FAMILY MEDICINE

## 2024-01-24 PROCEDURE — 82306 VITAMIN D 25 HYDROXY: CPT

## 2024-01-24 PROCEDURE — 1159F MED LIST DOCD IN RCRD: CPT | Performed by: FAMILY MEDICINE

## 2024-01-24 PROCEDURE — 80053 COMPREHEN METABOLIC PANEL: CPT

## 2024-01-24 PROCEDURE — 1160F RVW MEDS BY RX/DR IN RCRD: CPT | Performed by: FAMILY MEDICINE

## 2024-01-24 PROCEDURE — 3078F DIAST BP <80 MM HG: CPT | Performed by: FAMILY MEDICINE

## 2024-01-24 PROCEDURE — 85027 COMPLETE CBC AUTOMATED: CPT

## 2024-01-24 PROCEDURE — 1170F FXNL STATUS ASSESSED: CPT | Performed by: FAMILY MEDICINE

## 2024-01-24 PROCEDURE — 1126F AMNT PAIN NOTED NONE PRSNT: CPT | Performed by: FAMILY MEDICINE

## 2024-01-24 PROCEDURE — 99214 OFFICE O/P EST MOD 30 MIN: CPT | Performed by: FAMILY MEDICINE

## 2024-01-24 PROCEDURE — 84443 ASSAY THYROID STIM HORMONE: CPT

## 2024-01-24 PROCEDURE — G0439 PPPS, SUBSEQ VISIT: HCPCS | Performed by: FAMILY MEDICINE

## 2024-01-24 PROCEDURE — 36415 COLL VENOUS BLD VENIPUNCTURE: CPT

## 2024-01-24 PROCEDURE — 82962 GLUCOSE BLOOD TEST: CPT | Performed by: FAMILY MEDICINE

## 2024-01-24 PROCEDURE — 80061 LIPID PANEL: CPT

## 2024-01-24 RX ORDER — DONEPEZIL HYDROCHLORIDE 5 MG/1
5 TABLET, FILM COATED ORAL NIGHTLY
Qty: 90 TABLET | Refills: 3 | Status: SHIPPED | OUTPATIENT
Start: 2024-01-24 | End: 2024-04-15 | Stop reason: SDUPTHER

## 2024-01-24 RX ORDER — FLUOXETINE HYDROCHLORIDE 20 MG/1
CAPSULE ORAL
Qty: 180 CAPSULE | Refills: 3 | Status: SHIPPED | OUTPATIENT
Start: 2024-01-24

## 2024-01-24 ASSESSMENT — ACTIVITIES OF DAILY LIVING (ADL)
DOING_HOUSEWORK: INDEPENDENT
DOING_HOUSEWORK: INDEPENDENT
MANAGING_FINANCES: INDEPENDENT
TAKING_MEDICATION: INDEPENDENT
MANAGING_FINANCES: INDEPENDENT
TAKING_MEDICATION: INDEPENDENT
BATHING: INDEPENDENT
DRESSING: INDEPENDENT
GROCERY_SHOPPING: INDEPENDENT
GROCERY_SHOPPING: INDEPENDENT

## 2024-01-24 ASSESSMENT — PATIENT HEALTH QUESTIONNAIRE - PHQ9
2. FEELING DOWN, DEPRESSED OR HOPELESS: SEVERAL DAYS
1. LITTLE INTEREST OR PLEASURE IN DOING THINGS: NOT AT ALL
SUM OF ALL RESPONSES TO PHQ9 QUESTIONS 1 AND 2: 1

## 2024-01-24 ASSESSMENT — ENCOUNTER SYMPTOMS
DEPRESSION: 1
OCCASIONAL FEELINGS OF UNSTEADINESS: 1
LOSS OF SENSATION IN FEET: 0

## 2024-01-24 ASSESSMENT — PAIN SCALES - GENERAL: PAINLEVEL: 0-NO PAIN

## 2024-01-24 NOTE — PROGRESS NOTES
"Subjective   Reason for Visit: Harper Diana is an 78 y.o. female here for a Medicare Wellness visit.     Past Medical, Surgical, and Family History reviewed and updated in chart.    Reviewed all medications by prescribing practitioner or clinical pharmacist (such as prescriptions, OTCs, herbal therapies and supplements) and documented in the medical record.    HPI  :  Patient is a 78-year-old female who is in for her Medicare wellness physical.  She is in with her daughter who does look after her but does not live with her.  The patient lives with her son who is also on disability and does try to take care of her.  She does suffer with mild to moderate dementia which is periodic and she does have associated anxiety and panic attacks.  Patient is in today for recheck on blood work, review and refill on medication and apparently the wintertime has caused her more anxiety since she is indoors more frequently than outdoors.   Patient also does follow with psychiatry.    Patient Care Team:  Bassem Landaverde DO as PCP - General  Bassem Landaverde DO as PCP - [facility] Attributed Provider     Review of Systems  : 10 systems were reviewed and the information is included in the HPI and no additional review of systems is indicated.    Objective   Vitals:  /72   Pulse 72   Temp 36.1 °C (97 °F)   Resp 14   Ht 1.6 m (5' 3\")   Wt 67 kg (147 lb 12.8 oz)   SpO2 95%   BMI 26.18 kg/m²       Physical Exam  Vitals and nursing note reviewed.   Constitutional:       Appearance: Normal appearance.      Comments: Patient is alert and oriented x3.   No acute distress but patient has somewhat anxious and depressed today due to the weather and being indoors during the wintertime.   HENT:      Head: Normocephalic.      Right Ear: Tympanic membrane and external ear normal.      Left Ear: Tympanic membrane and external ear normal.      Ears:      Comments: Ears are patent bilaterally and TMs are clear.     Nose: Congestion " present.      Comments: Chronic sinus congestion from tobacco use.     Mouth/Throat:      Mouth: Mucous membranes are moist.      Pharynx: Oropharynx is clear.      Comments: Mouth is moist, tongue is midline.  No posterior pharyngeal erythema.  Eyes:      Extraocular Movements: Extraocular movements intact.      Conjunctiva/sclera: Conjunctivae normal.      Pupils: Pupils are equal, round, and reactive to light.      Comments: No visual disturbance, does have her eyes examined once a year.   Neck:      Comments: Restriction to range of motion testing cervical spine due to arthritis and secondary muscle spasm.  No carotid bruits, no thyromegaly, no cervical adenopathy.    Cardiovascular:      Rate and Rhythm: Normal rate and regular rhythm.      Pulses: Normal pulses.      Heart sounds: Normal heart sounds.      Comments: Patient denies chest pain and no palpitations.  Heart rhythm is stable S1 and S2 are noted, no ectopics.  Pulmonary:      Effort: Pulmonary effort is normal.      Breath sounds: Rhonchi present.      Comments: Patient does have a smoker's cough and lungs have bilateral rhonchi in all lung fields from tobacco.  She has no interest in quitting but she has cut down to about a half a pack a day.  Abdominal:      General: Bowel sounds are normal.      Palpations: Abdomen is soft.      Comments: Patient does have a history of colon cancer probably 20 years ago and has had colonoscopies in the recent past and does follow with gastroenterology.  She denies any abdominal pain or guarding and no rebound tenderness.   Genitourinary:     Comments: Patient denies dysuria, no hematuria,  denies flank pain.  Occasional urgency and nocturia.  Musculoskeletal:      Cervical back: Normal range of motion.      Comments: Long history of lumbosacral disc disease and occasional sciatica.  She states her back pain has been stable recently and she has not done anything to strain it.  Age-related arthritis in the joints.   Mild restriction of motion cervical and lumbar spines due to degenerative arthritis and muscle spasm.   Skin:     General: Skin is warm and dry.      Comments: She does suffer with dry skin especially in the wintertime.  There is no bruising, no erythema, no skin lesions noted, no rashes.   Neurological:      General: No focal deficit present.      Mental Status: She is alert and oriented to person, place, and time. Mental status is at baseline.      Sensory: Sensory deficit present.      Comments: Patient does get some paresthesias and sciatica from degenerative disc disease in the lumbar spine.  Currently she is stable and ambulating without any difficulty.  No focal neurosensory deficits are noted.   Coordination and gait are stable.  Normal muscle strength upper and lower extremities.   Psychiatric:         Thought Content: Thought content normal.         Judgment: Judgment normal.      Comments: Patient does have anxiety with depression and today has more anxiety and just has not been feeling well today.  She has not been out of the house very much due to the winter weather.  \SHe does have seasonal affective disorder and looks forward to springtime.  She also follows with psychiatry.  She has to take Xanax periodically for panic and anxiety.  She also needs a renewal on her Prozac today.  She does not take Xanax every day but only as needed.  In spite of her anxiety and depression she is very talkative today and she does understand her problems.     PLAN : Patient is a 78-year-old female who is in today for a Medicare wellness exam.  She did answer the questions as presented by the medical assistant and she does have a living will and medical power of .  Patient was suffering today of anxious depression partly due to the weather and not being outdoors very often.  I did have a long discussion with the patient and her daughter and she does need to get out of the house during the wintertime and visit the  2 daughters at their homes.  She occasionally has to take alprazolam for panic or anxiety but does not take it every day.  She also needed a refill on her Prozac today.  Patient's blood sugar today was 111 since she was worried about being diabetic.  She will be notified of her other blood results in 4 days and further recommendations will be made at that time.  The daughter was agreeable that mom needs to get out of the house more and visit the 2 daughters more often.  She does have seasonal affective disorder and just getting her out of the house would help.  Patient will follow-up in 4 to 6 months or sooner as needed.  Her medications were also refilled and she will be notified of her blood results when available.          Assessment/Plan   Problem List Items Addressed This Visit       AAA (abdominal aortic aneurysm) (CMS/[facility])    Dementia (CMS/[facility])    Hyperlipidemia    Hypothyroidism    Vitamin D deficiency    Medicare annual wellness visit, subsequent    Primary hypertension     Other Visit Diagnoses       Routine general medical examination at health care facility    -  Primary

## 2024-04-09 DIAGNOSIS — F32.A ANXIETY AND DEPRESSION: ICD-10-CM

## 2024-04-09 DIAGNOSIS — F41.9 ANXIETY AND DEPRESSION: ICD-10-CM

## 2024-04-10 RX ORDER — ALPRAZOLAM 0.25 MG/1
TABLET ORAL
Qty: 30 TABLET | Refills: 1 | Status: SHIPPED | OUTPATIENT
Start: 2024-04-10

## 2024-04-15 DIAGNOSIS — F01.B4 MODERATE VASCULAR DEMENTIA WITH ANXIETY (MULTI): ICD-10-CM

## 2024-04-15 RX ORDER — DONEPEZIL HYDROCHLORIDE 5 MG/1
10 TABLET, FILM COATED ORAL NIGHTLY
Qty: 180 TABLET | Refills: 3 | Status: SHIPPED | OUTPATIENT
Start: 2024-04-15

## 2024-04-18 ENCOUNTER — OFFICE VISIT (OUTPATIENT)
Dept: PRIMARY CARE | Facility: CLINIC | Age: 79
End: 2024-04-18
Payer: MEDICARE

## 2024-04-18 VITALS
WEIGHT: 146.8 LBS | HEIGHT: 63 IN | OXYGEN SATURATION: 97 % | HEART RATE: 68 BPM | BODY MASS INDEX: 26.01 KG/M2 | DIASTOLIC BLOOD PRESSURE: 80 MMHG | SYSTOLIC BLOOD PRESSURE: 110 MMHG

## 2024-04-18 DIAGNOSIS — R35.0 URINE FREQUENCY: ICD-10-CM

## 2024-04-18 DIAGNOSIS — F03.B4 MODERATE DEMENTIA WITH ANXIETY, UNSPECIFIED DEMENTIA TYPE (MULTI): Primary | ICD-10-CM

## 2024-04-18 DIAGNOSIS — R79.89 ABNORMAL CBC: ICD-10-CM

## 2024-04-18 DIAGNOSIS — F32.4 MAJOR DEPRESSIVE DISORDER IN PARTIAL REMISSION, UNSPECIFIED WHETHER RECURRENT (CMS-HCC): ICD-10-CM

## 2024-04-18 DIAGNOSIS — M54.16 LUMBAR RADICULITIS: ICD-10-CM

## 2024-04-18 DIAGNOSIS — J41.0 SIMPLE CHRONIC BRONCHITIS (MULTI): ICD-10-CM

## 2024-04-18 DIAGNOSIS — E03.9 ACQUIRED HYPOTHYROIDISM: ICD-10-CM

## 2024-04-18 DIAGNOSIS — M51.37 DISC DEGENERATION, LUMBOSACRAL: ICD-10-CM

## 2024-04-18 DIAGNOSIS — R79.9 ABNORMAL BLOOD CHEMISTRY: ICD-10-CM

## 2024-04-18 DIAGNOSIS — E78.2 MIXED HYPERLIPIDEMIA: ICD-10-CM

## 2024-04-18 DIAGNOSIS — R73.9 HYPERGLYCEMIA: ICD-10-CM

## 2024-04-18 DIAGNOSIS — R79.89 ABNORMAL THYROID BLOOD TEST: ICD-10-CM

## 2024-04-18 DIAGNOSIS — F03.918 BEHAVIORAL AND PSYCHOLOGICAL SYMPTOMS OF DEMENTIA (MULTI): ICD-10-CM

## 2024-04-18 DIAGNOSIS — I10 PRIMARY HYPERTENSION: ICD-10-CM

## 2024-04-18 LAB
ALBUMIN SERPL BCP-MCNC: 4.4 G/DL (ref 3.4–5)
ALP SERPL-CCNC: 64 U/L (ref 33–136)
ALT SERPL W P-5'-P-CCNC: 12 U/L (ref 7–45)
ANION GAP SERPL CALC-SCNC: 17 MMOL/L (ref 10–20)
APPEARANCE UR: CLEAR
AST SERPL W P-5'-P-CCNC: 19 U/L (ref 9–39)
BILIRUB SERPL-MCNC: 0.8 MG/DL (ref 0–1.2)
BILIRUB UR QL STRIP: ABNORMAL
BUN SERPL-MCNC: 16 MG/DL (ref 6–23)
CALCIUM SERPL-MCNC: 9.4 MG/DL (ref 8.6–10.6)
CHLORIDE SERPL-SCNC: 106 MMOL/L (ref 98–107)
CO2 SERPL-SCNC: 26 MMOL/L (ref 21–32)
COLOR UR: ABNORMAL
CREAT SERPL-MCNC: 0.83 MG/DL (ref 0.5–1.05)
EGFRCR SERPLBLD CKD-EPI 2021: 72 ML/MIN/1.73M*2
ERYTHROCYTE [DISTWIDTH] IN BLOOD BY AUTOMATED COUNT: 12.6 % (ref 11.5–14.5)
GLUCOSE SERPL-MCNC: 64 MG/DL (ref 74–99)
GLUCOSE UR STRIP-MCNC: NEGATIVE MG/DL
HCT VFR BLD AUTO: 43.2 % (ref 36–46)
HGB BLD-MCNC: 14 G/DL (ref 12–16)
HGB UR QL STRIP: NEGATIVE
KETONES UR STRIP-MCNC: ABNORMAL MG/DL
LEUKOCYTE ESTERASE UR QL STRIP: NEGATIVE
MCH RBC QN AUTO: 34.8 PG (ref 26–34)
MCHC RBC AUTO-ENTMCNC: 32.4 G/DL (ref 32–36)
MCV RBC AUTO: 108 FL (ref 80–100)
NITRITE UR QL STRIP: NEGATIVE
NRBC BLD-RTO: 0 /100 WBCS (ref 0–0)
PH UR STRIP: 5.5 [PH]
PLATELET # BLD AUTO: 258 X10*3/UL (ref 150–450)
POTASSIUM SERPL-SCNC: 4.8 MMOL/L (ref 3.5–5.3)
PROT SERPL-MCNC: 7 G/DL (ref 6.4–8.2)
PROT UR STRIP-MCNC: ABNORMAL MG/DL
RBC # BLD AUTO: 4.02 X10*6/UL (ref 4–5.2)
SODIUM SERPL-SCNC: 144 MMOL/L (ref 136–145)
SP GR UR STRIP.AUTO: >=1.03
TSH SERPL-ACNC: 3.31 MIU/L (ref 0.44–3.98)
UROBILINOGEN UR STRIP-ACNC: 0.2 E.U./DL
WBC # BLD AUTO: 5.9 X10*3/UL (ref 4.4–11.3)

## 2024-04-18 PROCEDURE — 81003 URINALYSIS AUTO W/O SCOPE: CPT | Performed by: FAMILY MEDICINE

## 2024-04-18 PROCEDURE — 3079F DIAST BP 80-89 MM HG: CPT | Performed by: FAMILY MEDICINE

## 2024-04-18 PROCEDURE — 1159F MED LIST DOCD IN RCRD: CPT | Performed by: FAMILY MEDICINE

## 2024-04-18 PROCEDURE — 85027 COMPLETE CBC AUTOMATED: CPT

## 2024-04-18 PROCEDURE — 80053 COMPREHEN METABOLIC PANEL: CPT

## 2024-04-18 PROCEDURE — 99214 OFFICE O/P EST MOD 30 MIN: CPT | Performed by: FAMILY MEDICINE

## 2024-04-18 PROCEDURE — 84443 ASSAY THYROID STIM HORMONE: CPT

## 2024-04-18 PROCEDURE — 3074F SYST BP LT 130 MM HG: CPT | Performed by: FAMILY MEDICINE

## 2024-04-18 PROCEDURE — 36415 COLL VENOUS BLD VENIPUNCTURE: CPT

## 2024-04-18 PROCEDURE — 1160F RVW MEDS BY RX/DR IN RCRD: CPT | Performed by: FAMILY MEDICINE

## 2024-04-18 RX ORDER — DEXTROMETHORPHAN HYDROBROMIDE, BUPROPION HYDROCHLORIDE 105; 45 MG/1; MG/1
1 TABLET, MULTILAYER, EXTENDED RELEASE ORAL DAILY
COMMUNITY
End: 2024-05-20 | Stop reason: SDUPTHER

## 2024-04-18 ASSESSMENT — PATIENT HEALTH QUESTIONNAIRE - PHQ9
2. FEELING DOWN, DEPRESSED OR HOPELESS: NOT AT ALL
SUM OF ALL RESPONSES TO PHQ9 QUESTIONS 1 AND 2: 0
1. LITTLE INTEREST OR PLEASURE IN DOING THINGS: NOT AT ALL

## 2024-04-18 NOTE — PROGRESS NOTES
"Subjective   Harper Diana is a 79 y.o. female who presents for Altered Mental Status (Confusion, nightmares, having hard days, forgetful- Daughter reports she has been \"off\" for the last 3 weeks and she is very concerned /Takes Xanax daily and she's worried this is effecting her dementia/She says she is moving bowels well, urinating well, no signs of UTI).    HPI :Patient is a 79-year-old female who is brought in by  the daughter for reevaluation of some mental status changes.  Apparently the patient's brother  about 3 weeks ago and she has been having some nightmares and dreams with some increased confusion.  Patient does see psychiatry also and is on Aricept at bedtime.  The daughter was thinking possibly with death of her brother 3 weeks ago might of triggered something causing her these nightmares.  Patient still takes her Prozac daily and alprazolam when needed for panic and anxiety.  She lives with the son who dispenses her medication and the daughter tries to see her on weekends since she works night shift.      Objective  : ROS :10 systems were reviewed and the information is included in the HPI and no additional review of systems is indicated.    Physical Exam  Vitals and nursing note reviewed.   Constitutional:       Appearance: Normal appearance.      Comments: Patient is alert and oriented x3.   No acute distress   HENT:      Head: Normocephalic.      Right Ear: Tympanic membrane and external ear normal.      Left Ear: Tympanic membrane and external ear normal.      Ears:      Comments: Ears are patent bilaterally and TMs are clear.     Nose: Nose normal.      Mouth/Throat:      Mouth: Mucous membranes are moist.      Pharynx: Oropharynx is clear.      Comments: Mouth is moist, tongue is midline.  No posterior pharyngeal erythema.  Eyes:      Extraocular Movements: Extraocular movements intact.      Conjunctiva/sclera: Conjunctivae normal.      Pupils: Pupils are equal, round, and reactive to " light.      Comments: No visual disturbance, does have her eyes examined once a year.   Neck:      Comments: No carotid bruits, no thyromegaly, no cervical adenopathy.  Occasional neck spasm and restriction of motion secondary to stress and tension.  Cardiovascular:      Rate and Rhythm: Normal rate and regular rhythm.      Pulses: Normal pulses.      Heart sounds: Normal heart sounds.      Comments: Patient denies chest pain and no palpitations.  Heart rhythm is stable S1 and S2 are noted, no ectopics.  Pulmonary:      Effort: Pulmonary effort is normal.      Breath sounds: Rhonchi present.      Comments: Still smoking .  Lungs  with few scattered rhonchi from Tobacco.    Abdominal:      General: Bowel sounds are normal.      Palpations: Abdomen is soft.      Comments: Abdomen is soft and nontender, no hepatosplenomegaly.  No flank tenderness.  No suprapubic pain.  Positive bowel sounds x4.  No abdominal guarding and no rebound tenderness.   Genitourinary:     Comments: Patient denies dysuria, no hematuria, no nocturia, denies flank pain.  Musculoskeletal:         General: Tenderness present. Normal range of motion.      Cervical back: Normal range of motion.      Comments: Lumbosacral disc disease.  Arthritis of cervical , thoracic and lumbar spines.   Skin:     General: Skin is warm and dry.      Comments: There is no bruising, no erythema, no skin lesions noted, no rashes.   Neurological:      General: No focal deficit present.      Mental Status: She is alert and oriented to person, place, and time. Mental status is at baseline.      Sensory: Sensory deficit present.      Comments: Patient does have paresthesias from lumbosacral disc disease and occasional sciatica.  She does have dementia and does also follow with psychiatry.  She only takes the alprazolam as needed for panic and anxiety.  Coordination and gait are stable.  Normal muscle strength upper and lower extremities.   Psychiatric:         Behavior:  Behavior normal.         Thought Content: Thought content normal.         Judgment: Judgment normal.      Comments: Patient does have dementia and has seen psychiatry and currently is stable on Aricept, Prozac and occasional Xanax as needed.  I do think this increase in confusion than anxiety as a result of her brother recently passing away 3 weeks ago.  After a long discussion and talk with the patient and daughter we will try her on Auvelity , and has an antidepressant during the daytime.     PLAN : Patient is a 79-year-old female who was evaluated today with the daughter present after increase in mental confusion and some mental status changes.  I did check a urine on her and the pH was 5.5, specific gravity was greater than 1.030, negative leukocytes, negative nitrites, trace of protein, negative blood, negative glucose.  I was thinking that possible UTI might be causing some increased confusion but after a long discussion I do think it is the recent death of her 92-year-old brother.  Apparently they had 7 siblings and now there are only 2 living.  The patient does only take Xanax as needed which is infrequently and that it was only 0.25 mg.  I did add on  Auvelity for patient to try once a day in the afternoon we will see if it helps with the depression and intermittent agitation.  Patient will follow-up in 6 weeks and she will also be notified of her blood work that was checked today.  Further recommendations will be made after review of her blood results.    Problem List Items Addressed This Visit       Hyperlipidemia    Hypothyroidism    Primary hypertension     Other Visit Diagnoses       Abnormal thyroid blood test        Abnormal blood chemistry        Abnormal CBC        Hyperglycemia                     Bassem Landaverde,

## 2024-04-19 NOTE — RESULT ENCOUNTER NOTE
Kidney and liver function are normal             blood sugar was a little bit low at 64      probably from not eating     red and white blood cell counts are normal      thyroid function is stable     blood work all looks stable      hopefully the new medication will help some of the anxiety depression

## 2024-05-20 ENCOUNTER — OFFICE VISIT (OUTPATIENT)
Dept: PRIMARY CARE | Facility: CLINIC | Age: 79
End: 2024-05-20
Payer: MEDICARE

## 2024-05-20 VITALS
RESPIRATION RATE: 14 BRPM | HEART RATE: 57 BPM | TEMPERATURE: 97.9 F | SYSTOLIC BLOOD PRESSURE: 158 MMHG | DIASTOLIC BLOOD PRESSURE: 82 MMHG | OXYGEN SATURATION: 97 % | BODY MASS INDEX: 24.8 KG/M2 | WEIGHT: 140 LBS | HEIGHT: 63 IN

## 2024-05-20 DIAGNOSIS — I10 PRIMARY HYPERTENSION: ICD-10-CM

## 2024-05-20 DIAGNOSIS — F03.B4 MODERATE DEMENTIA WITH ANXIETY, UNSPECIFIED DEMENTIA TYPE (MULTI): ICD-10-CM

## 2024-05-20 DIAGNOSIS — K59.04 CHRONIC IDIOPATHIC CONSTIPATION: ICD-10-CM

## 2024-05-20 DIAGNOSIS — F03.918 BEHAVIORAL AND PSYCHOLOGICAL SYMPTOMS OF DEMENTIA (MULTI): Primary | ICD-10-CM

## 2024-05-20 DIAGNOSIS — F17.200 TOBACCO DEPENDENCE: ICD-10-CM

## 2024-05-20 DIAGNOSIS — F32.4 MAJOR DEPRESSIVE DISORDER IN PARTIAL REMISSION, UNSPECIFIED WHETHER RECURRENT (CMS-HCC): ICD-10-CM

## 2024-05-20 DIAGNOSIS — F41.8 ANXIOUS DEPRESSION: ICD-10-CM

## 2024-05-20 PROCEDURE — 3077F SYST BP >= 140 MM HG: CPT | Performed by: FAMILY MEDICINE

## 2024-05-20 PROCEDURE — 1126F AMNT PAIN NOTED NONE PRSNT: CPT | Performed by: FAMILY MEDICINE

## 2024-05-20 PROCEDURE — 99214 OFFICE O/P EST MOD 30 MIN: CPT | Performed by: FAMILY MEDICINE

## 2024-05-20 PROCEDURE — 1160F RVW MEDS BY RX/DR IN RCRD: CPT | Performed by: FAMILY MEDICINE

## 2024-05-20 PROCEDURE — 3079F DIAST BP 80-89 MM HG: CPT | Performed by: FAMILY MEDICINE

## 2024-05-20 PROCEDURE — 1159F MED LIST DOCD IN RCRD: CPT | Performed by: FAMILY MEDICINE

## 2024-05-20 RX ORDER — DEXTROMETHORPHAN HYDROBROMIDE, BUPROPION HYDROCHLORIDE 105; 45 MG/1; MG/1
1 TABLET, MULTILAYER, EXTENDED RELEASE ORAL DAILY
Qty: 90 TABLET | Refills: 3 | Status: SHIPPED | OUTPATIENT
Start: 2024-05-20

## 2024-05-20 ASSESSMENT — PATIENT HEALTH QUESTIONNAIRE - PHQ9
SUM OF ALL RESPONSES TO PHQ9 QUESTIONS 1 AND 2: 0
1. LITTLE INTEREST OR PLEASURE IN DOING THINGS: NOT AT ALL
2. FEELING DOWN, DEPRESSED OR HOPELESS: SEVERAL DAYS
SUM OF ALL RESPONSES TO PHQ9 QUESTIONS 1 AND 2: 1
1. LITTLE INTEREST OR PLEASURE IN DOING THINGS: NOT AT ALL
2. FEELING DOWN, DEPRESSED OR HOPELESS: NOT AT ALL

## 2024-05-20 ASSESSMENT — PAIN SCALES - GENERAL: PAINLEVEL: 0-NO PAIN

## 2024-05-20 NOTE — PROGRESS NOTES
Subjective   Harper Diana is a 79 y.o. female who presents for Follow-up (Follow up visit for anxiety and depression).    HPI  : Patient is a 79-year-old female who is brought in by her daughter with symptoms of anxious depression and also for medication recheck.  Patient had been seen about 4 weeks ago and received that he had an antidepressant, called  Auvelity, and it does seem to be helping her quite a bit.  Patient is much more alert and talkative and seems to be happy and not depressed.  She is also working on her yard more since the weather has improved and she does like out door  yard work.  Patient also has occasional panic attacks and occasionally does have to take alprazolam.    Objective  :  ROS : 10 systems were reviewed and the information is included in the HPI and no additional review of systems is indicated.    Physical Exam  Vitals and nursing note reviewed.   Constitutional:       Appearance: Normal appearance.      Comments: Patient is alert and oriented x3.   No acute distress   HENT:      Head: Normocephalic.      Right Ear: Tympanic membrane and external ear normal.      Left Ear: Tympanic membrane and external ear normal.      Ears:      Comments: Ears are patent bilaterally and TMs are clear.     Nose: Nose normal.      Mouth/Throat:      Mouth: Mucous membranes are moist.      Pharynx: Oropharynx is clear.      Comments: Mouth is moist, tongue is midline.  No posterior pharyngeal erythema.  Eyes:      Extraocular Movements: Extraocular movements intact.      Conjunctiva/sclera: Conjunctivae normal.      Pupils: Pupils are equal, round, and reactive to light.      Comments: No visual disturbance, does have her eyes examined once a year.   Neck:      Comments: Moderate cervical restriction of motion due to cervical arthritis.  No carotid bruits, no thyromegaly, no cervical adenopathy.  Occasional neck spasm and restriction of motion secondary to stress and tension.  Cardiovascular:       Rate and Rhythm: Normal rate and regular rhythm.      Pulses: Normal pulses.      Heart sounds: Normal heart sounds.      Comments: Patient denies chest pain and no palpitations.  Heart rhythm is stable S1 and S2 are noted.  Pulmonary:      Effort: Pulmonary effort is normal.      Breath sounds: Rhonchi present.      Comments: Patient does continue to smoke but is trying to cut down.  She does have a few scattered rhonchi on auscultation.  Abdominal:      General: Bowel sounds are normal.      Palpations: Abdomen is soft.      Comments: Patient does have a history of colon cancer with colon resection probably 20 years ago and she has been stable since.  He does follow-up with gastroenterology and general surgery.  Denies any abdominal pain and no flank tenderness.   Genitourinary:     Comments: Patient denies dysuria, no hematuria, no nocturia, denies flank pain.  Musculoskeletal:         General: Tenderness present. Normal range of motion.      Cervical back: Normal range of motion. Tenderness present.      Comments: Chronic lumbosacral disc disease and history of sciatica.  No ankle edema noted.  Age-related arthritis in the shoulders and hips.  Restricted range of motion lumbosacral spine due to DJD.   Skin:     General: Skin is warm.      Comments: There is no bruising, no erythema, no skin lesions noted, no rashes.   Neurological:      General: No focal deficit present.      Mental Status: She is alert and oriented to person, place, and time. Mental status is at baseline.      Sensory: Sensory deficit present.      Comments: Patient does have dementia and is somewhat forgetful.  She lives with one of her sons and the daughter does check up on her every day via phone call.  Patient is taking  Auvelity, and it does seem to be helping with her anxious depression.  Coordination and gait seems stable but she does have chronic lumbosacral disc disease and sciatica.   Psychiatric:         Behavior: Behavior normal.       Comments: Patient has anxious mood and affect.  Patient does have dementia but does respond when spoken with.  She relies on her daughter for decision making and judgment decisions.  Auvelity  seems to help.  She does stay active working out in her yard and she loves to do yard work and planting flowers.     PLAN : Patient is a 79-year-old female who has anxious depression and also dementia.  She does take her Aricept every night and she has been taking Prozac and now a new medicine called  Auvelity, and she does seem to be doing better than last visit.  She is with her daughter and her daughter does regulate her medications and occasionally she does have a panic attack and has to take an alprazolam.  I did have a long discussion with the daughter and the patient is doing much better with the new medication and she is also starting work outdoors in her yard planting flowers since the weather has improved.  Patient will follow-up in 3 to 4 months and continue on her current medications as previously ordered.  She does also follow with psychiatry and neurology.    Problem List Items Addressed This Visit    None           Bassem Landaverde, DO

## 2024-05-30 ENCOUNTER — APPOINTMENT (OUTPATIENT)
Dept: PRIMARY CARE | Facility: CLINIC | Age: 79
End: 2024-05-30
Payer: MEDICARE

## 2024-06-05 DIAGNOSIS — I10 PRIMARY HYPERTENSION: ICD-10-CM

## 2024-06-06 RX ORDER — METOPROLOL SUCCINATE 25 MG/1
25 TABLET, EXTENDED RELEASE ORAL DAILY
Qty: 90 TABLET | Refills: 2 | Status: SHIPPED | OUTPATIENT
Start: 2024-06-06

## 2024-06-25 DIAGNOSIS — F32.A ANXIETY AND DEPRESSION: ICD-10-CM

## 2024-06-25 DIAGNOSIS — F41.9 ANXIETY AND DEPRESSION: ICD-10-CM

## 2024-06-25 RX ORDER — ALPRAZOLAM 0.25 MG/1
TABLET ORAL
Qty: 30 TABLET | Refills: 1 | Status: SHIPPED | OUTPATIENT
Start: 2024-06-25

## 2024-08-23 DIAGNOSIS — E07.9 DISORDER OF THYROID, UNSPECIFIED: ICD-10-CM

## 2024-08-23 RX ORDER — LEVOTHYROXINE SODIUM 25 UG/1
25 TABLET ORAL DAILY
Qty: 90 TABLET | Refills: 3 | Status: SHIPPED | OUTPATIENT
Start: 2024-08-23

## 2024-09-16 DIAGNOSIS — F41.9 ANXIETY AND DEPRESSION: ICD-10-CM

## 2024-09-16 DIAGNOSIS — F32.A ANXIETY AND DEPRESSION: ICD-10-CM

## 2024-09-16 RX ORDER — ALPRAZOLAM 0.25 MG/1
TABLET ORAL
Qty: 30 TABLET | Refills: 1 | Status: SHIPPED | OUTPATIENT
Start: 2024-09-16

## 2024-09-25 ENCOUNTER — APPOINTMENT (OUTPATIENT)
Dept: PRIMARY CARE | Facility: CLINIC | Age: 79
End: 2024-09-25
Payer: MEDICARE

## 2024-09-25 VITALS
WEIGHT: 139 LBS | BODY MASS INDEX: 24.63 KG/M2 | OXYGEN SATURATION: 98 % | HEART RATE: 55 BPM | HEIGHT: 63 IN | DIASTOLIC BLOOD PRESSURE: 74 MMHG | TEMPERATURE: 97.8 F | RESPIRATION RATE: 14 BRPM | SYSTOLIC BLOOD PRESSURE: 144 MMHG

## 2024-09-25 DIAGNOSIS — F03.B4 MODERATE DEMENTIA WITH ANXIETY, UNSPECIFIED DEMENTIA TYPE (MULTI): ICD-10-CM

## 2024-09-25 DIAGNOSIS — F17.200 TOBACCO DEPENDENCE: ICD-10-CM

## 2024-09-25 DIAGNOSIS — E03.9 ACQUIRED HYPOTHYROIDISM: ICD-10-CM

## 2024-09-25 DIAGNOSIS — E78.2 MIXED HYPERLIPIDEMIA: ICD-10-CM

## 2024-09-25 DIAGNOSIS — F41.0 PANIC ATTACKS: ICD-10-CM

## 2024-09-25 DIAGNOSIS — I10 PRIMARY HYPERTENSION: Primary | ICD-10-CM

## 2024-09-25 DIAGNOSIS — F32.4 MAJOR DEPRESSIVE DISORDER IN PARTIAL REMISSION, UNSPECIFIED WHETHER RECURRENT (CMS-HCC): ICD-10-CM

## 2024-09-25 LAB
ALBUMIN SERPL BCP-MCNC: 4.3 G/DL (ref 3.4–5)
ALP SERPL-CCNC: 64 U/L (ref 33–136)
ALT SERPL W P-5'-P-CCNC: 10 U/L (ref 7–45)
ANION GAP SERPL CALC-SCNC: 19 MMOL/L (ref 10–20)
AST SERPL W P-5'-P-CCNC: 13 U/L (ref 9–39)
BILIRUB SERPL-MCNC: 0.9 MG/DL (ref 0–1.2)
BUN SERPL-MCNC: 16 MG/DL (ref 6–23)
CALCIUM SERPL-MCNC: 8.9 MG/DL (ref 8.6–10.6)
CHLORIDE SERPL-SCNC: 107 MMOL/L (ref 98–107)
CHOLEST SERPL-MCNC: 151 MG/DL (ref 0–199)
CHOLESTEROL/HDL RATIO: 3.1
CO2 SERPL-SCNC: 22 MMOL/L (ref 21–32)
CREAT SERPL-MCNC: 0.89 MG/DL (ref 0.5–1.05)
EGFRCR SERPLBLD CKD-EPI 2021: 66 ML/MIN/1.73M*2
ERYTHROCYTE [DISTWIDTH] IN BLOOD BY AUTOMATED COUNT: 12.6 % (ref 11.5–14.5)
GLUCOSE SERPL-MCNC: 94 MG/DL (ref 74–99)
HCT VFR BLD AUTO: 41.9 % (ref 36–46)
HDLC SERPL-MCNC: 48.4 MG/DL
HGB BLD-MCNC: 13.5 G/DL (ref 12–16)
LDLC SERPL CALC-MCNC: 84 MG/DL
MCH RBC QN AUTO: 33.8 PG (ref 26–34)
MCHC RBC AUTO-ENTMCNC: 32.2 G/DL (ref 32–36)
MCV RBC AUTO: 105 FL (ref 80–100)
NON HDL CHOLESTEROL: 103 MG/DL (ref 0–149)
NRBC BLD-RTO: 0 /100 WBCS (ref 0–0)
PLATELET # BLD AUTO: 204 X10*3/UL (ref 150–450)
POTASSIUM SERPL-SCNC: 3.7 MMOL/L (ref 3.5–5.3)
PROT SERPL-MCNC: 6.5 G/DL (ref 6.4–8.2)
RBC # BLD AUTO: 3.99 X10*6/UL (ref 4–5.2)
SODIUM SERPL-SCNC: 144 MMOL/L (ref 136–145)
TRIGL SERPL-MCNC: 92 MG/DL (ref 0–149)
TSH SERPL-ACNC: 3.77 MIU/L (ref 0.44–3.98)
VLDL: 18 MG/DL (ref 0–40)
WBC # BLD AUTO: 6.1 X10*3/UL (ref 4.4–11.3)

## 2024-09-25 PROCEDURE — 99214 OFFICE O/P EST MOD 30 MIN: CPT | Performed by: FAMILY MEDICINE

## 2024-09-25 PROCEDURE — 84443 ASSAY THYROID STIM HORMONE: CPT

## 2024-09-25 PROCEDURE — 1124F ACP DISCUSS-NO DSCNMKR DOCD: CPT | Performed by: FAMILY MEDICINE

## 2024-09-25 PROCEDURE — 3077F SYST BP >= 140 MM HG: CPT | Performed by: FAMILY MEDICINE

## 2024-09-25 PROCEDURE — 80061 LIPID PANEL: CPT

## 2024-09-25 PROCEDURE — 80053 COMPREHEN METABOLIC PANEL: CPT

## 2024-09-25 PROCEDURE — 1160F RVW MEDS BY RX/DR IN RCRD: CPT | Performed by: FAMILY MEDICINE

## 2024-09-25 PROCEDURE — 1126F AMNT PAIN NOTED NONE PRSNT: CPT | Performed by: FAMILY MEDICINE

## 2024-09-25 PROCEDURE — 3078F DIAST BP <80 MM HG: CPT | Performed by: FAMILY MEDICINE

## 2024-09-25 PROCEDURE — 85027 COMPLETE CBC AUTOMATED: CPT

## 2024-09-25 PROCEDURE — 1159F MED LIST DOCD IN RCRD: CPT | Performed by: FAMILY MEDICINE

## 2024-09-25 ASSESSMENT — PATIENT HEALTH QUESTIONNAIRE - PHQ9
SUM OF ALL RESPONSES TO PHQ9 QUESTIONS 1 AND 2: 1
1. LITTLE INTEREST OR PLEASURE IN DOING THINGS: NOT AT ALL
2. FEELING DOWN, DEPRESSED OR HOPELESS: SEVERAL DAYS

## 2024-09-25 ASSESSMENT — PAIN SCALES - GENERAL: PAINLEVEL: 0-NO PAIN

## 2024-09-25 NOTE — PROGRESS NOTES
Subjective   Harper Diana is a 79 y.o. female who presents for Follow-up (Follow up visit, blood pressure check and blood work today).    HPI  : Patient is a 79 year old female in for  recheck on blood work and blood pressure. Daughter is concerned about  weight loss.   Still smoking and trying to cut down but cannot quit.  Needs nutrition supplements.  Patient does not really cook for herself and tends to forget to eat at home.  Patient recently saw the geriatric specialist and she does have major neurocognitive disorder and moderate dementia.  She also suffers with anxiety and panic attacks.    Objective  : ROS : 10 systems were reviewed and the information is included in the HPI and no additional review of systems is indicated.    Physical Exam  Vitals and nursing note reviewed.   Constitutional:       Appearance: Normal appearance.      Comments: Patient is alert and oriented x3.   No acute distress   HENT:      Head: Normocephalic.      Right Ear: Tympanic membrane and external ear normal.      Left Ear: Tympanic membrane and external ear normal.      Ears:      Comments: Ears are patent bilaterally and TMs are clear.     Nose: Nose normal.      Mouth/Throat:      Mouth: Mucous membranes are moist.      Pharynx: Oropharynx is clear.      Comments: Mouth is moist, tongue is midline.  No posterior pharyngeal erythema.  Eyes:      Extraocular Movements: Extraocular movements intact.      Conjunctiva/sclera: Conjunctivae normal.      Pupils: Pupils are equal, round, and reactive to light.      Comments: No visual disturbance, does have her eyes examined once a year.   Neck:      Comments: No carotid bruits, no thyromegaly, no cervical adenopathy.  Neck spasm and restriction of motion secondary to stress and tension.  Cardiovascular:      Rate and Rhythm: Normal rate and regular rhythm.      Pulses: Normal pulses.      Heart sounds: Normal heart sounds.      Comments: Patient denies chest pain and no palpitations.   Heart rhythm is stable S1 and S2 are noted, no ectopics.  Pulmonary:      Effort: Pulmonary effort is normal.      Breath sounds: Rhonchi present.      Comments: Patient is still smoking and long talk about quitting tobacco. . Lungs with few rhonchi to auscultation.   Abdominal:      General: Bowel sounds are normal.      Palpations: Abdomen is soft.      Comments: History of  colon cancer many years ago.  Does follow with gastroenterology for colonoscopies.  Denies any abdominal pain, bowels are stable just needs to eat better for nutritional support.   Genitourinary:     Comments: Patient denies dysuria, no hematuria, no nocturia, denies flank pain.  Musculoskeletal:         General: Tenderness present. Normal range of motion.      Cervical back: Normal range of motion.      Comments: Chronic lumbosacral degenerative disc disease.  Age-related arthritis in the joints. Restriction of motion cervical and lumbar spines due to arthritis, and muscle spasm.  No ankle edema.   Skin:     General: Skin is warm.      Comments: There is no bruising, no erythema, no skin lesions noted, no rashes.   Neurological:      General: No focal deficit present.      Mental Status: She is alert and oriented to person, place, and time. Mental status is at baseline.      Comments: No focal neurosensory deficits are noted.  Paresthesias in the lower extremities from lumbosacral disc disease and occasional sciatica.  Coordination and gait are stable.  Normal muscle strength upper and lower extremities.   Psychiatric:         Behavior: Behavior normal.         Judgment: Judgment normal.      Comments: Patient has flat  mood and affect.  Patient does have a cognitive disorder and also suffers with chronic depression, anxiety and panic attacks.  Currently is very stable on her current medications.  She does rely on her daughter for decision making and patient is occasionally forgetful.     PLAN : Patient is a 79-year-old female who was  evaluated today for recheck on blood pressure, blood work and review of medications.  She recently saw the geriatric doctor at the [facility] and does have a cognitive disorder but is currently stable on her medications.  Her nutritional status is not the best and she does need to try some Ensure nutritional protein shakes on a daily basis.  I did discuss this in detail with the daughter and she will try to purchase some.  Patient currently lives with the son who has a lot of health issues and is also diabetic.  Patient is actually doing very well today aside from some weight loss but mentally she is alert and very talkative.  She will continue on the same medications and she will be notified of her blood results in 3 days and follow-up in 6 months or sooner as needed.    Problem List Items Addressed This Visit       Dementia (Multi)    Relevant Orders    CBC    Comprehensive Metabolic Panel    Lipid Panel    Thyroid Stimulating Hormone    Hyperlipidemia    Relevant Orders    CBC    Comprehensive Metabolic Panel    Lipid Panel    Thyroid Stimulating Hormone    Hypothyroidism    Relevant Orders    CBC    Comprehensive Metabolic Panel    Lipid Panel    Thyroid Stimulating Hormone    Primary hypertension    Relevant Orders    CBC    Comprehensive Metabolic Panel    Lipid Panel    Thyroid Stimulating Hormone            Bassem Landaverde, DO

## 2024-09-26 NOTE — RESULT ENCOUNTER NOTE
Red and white blood cell counts are normal      thyroid function is normal       cholesterol is normal at 151       triglycerides are normal at 92    blood sugar, kidney and liver function are normal        blood work all looks stable   just try to eat better to gain some weight.

## 2024-10-22 DIAGNOSIS — F41.9 ANXIETY AND DEPRESSION: ICD-10-CM

## 2024-10-22 DIAGNOSIS — F32.A ANXIETY AND DEPRESSION: ICD-10-CM

## 2024-10-22 RX ORDER — ALPRAZOLAM 0.25 MG/1
TABLET ORAL
Qty: 30 TABLET | Refills: 1 | Status: SHIPPED | OUTPATIENT
Start: 2024-10-22

## 2025-01-20 ENCOUNTER — APPOINTMENT (OUTPATIENT)
Dept: PRIMARY CARE | Facility: CLINIC | Age: 80
End: 2025-01-20
Payer: MEDICARE

## 2025-01-20 VITALS
OXYGEN SATURATION: 94 % | TEMPERATURE: 97.9 F | RESPIRATION RATE: 16 BRPM | SYSTOLIC BLOOD PRESSURE: 120 MMHG | BODY MASS INDEX: 24.8 KG/M2 | HEIGHT: 63 IN | DIASTOLIC BLOOD PRESSURE: 78 MMHG | WEIGHT: 140 LBS | HEART RATE: 78 BPM

## 2025-01-20 DIAGNOSIS — M51.370 DEGENERATION OF INTERVERTEBRAL DISC OF LUMBOSACRAL REGION WITH DISCOGENIC BACK PAIN: ICD-10-CM

## 2025-01-20 DIAGNOSIS — F03.B4 MODERATE DEMENTIA WITH ANXIETY, UNSPECIFIED DEMENTIA TYPE (MULTI): Primary | ICD-10-CM

## 2025-01-20 DIAGNOSIS — F41.8 ANXIOUS DEPRESSION: ICD-10-CM

## 2025-01-20 DIAGNOSIS — I63.81 MULTIPLE LACUNAR INFARCTS (MULTI): ICD-10-CM

## 2025-01-20 DIAGNOSIS — F32.A ANXIETY AND DEPRESSION: ICD-10-CM

## 2025-01-20 DIAGNOSIS — F17.200 TOBACCO DEPENDENCE: ICD-10-CM

## 2025-01-20 DIAGNOSIS — F41.9 ANXIETY AND DEPRESSION: ICD-10-CM

## 2025-01-20 DIAGNOSIS — J41.0 SIMPLE CHRONIC BRONCHITIS (MULTI): ICD-10-CM

## 2025-01-20 DIAGNOSIS — F41.0 PANIC ATTACKS: ICD-10-CM

## 2025-01-20 PROCEDURE — 99214 OFFICE O/P EST MOD 30 MIN: CPT | Performed by: FAMILY MEDICINE

## 2025-01-20 PROCEDURE — 3078F DIAST BP <80 MM HG: CPT | Performed by: FAMILY MEDICINE

## 2025-01-20 PROCEDURE — 1126F AMNT PAIN NOTED NONE PRSNT: CPT | Performed by: FAMILY MEDICINE

## 2025-01-20 PROCEDURE — 3074F SYST BP LT 130 MM HG: CPT | Performed by: FAMILY MEDICINE

## 2025-01-20 PROCEDURE — 1123F ACP DISCUSS/DSCN MKR DOCD: CPT | Performed by: FAMILY MEDICINE

## 2025-01-20 PROCEDURE — 1159F MED LIST DOCD IN RCRD: CPT | Performed by: FAMILY MEDICINE

## 2025-01-20 PROCEDURE — 1160F RVW MEDS BY RX/DR IN RCRD: CPT | Performed by: FAMILY MEDICINE

## 2025-01-20 RX ORDER — ALPRAZOLAM 0.25 MG/1
TABLET ORAL
Qty: 30 TABLET | Refills: 1 | Status: SHIPPED | OUTPATIENT
Start: 2025-01-20 | End: 2025-01-20 | Stop reason: SDUPTHER

## 2025-01-20 RX ORDER — ALPRAZOLAM 0.25 MG/1
TABLET ORAL
Qty: 60 TABLET | Refills: 3 | Status: SHIPPED | OUTPATIENT
Start: 2025-01-20

## 2025-01-20 ASSESSMENT — PATIENT HEALTH QUESTIONNAIRE - PHQ9
SUM OF ALL RESPONSES TO PHQ9 QUESTIONS 1 AND 2: 0
2. FEELING DOWN, DEPRESSED OR HOPELESS: NOT AT ALL
1. LITTLE INTEREST OR PLEASURE IN DOING THINGS: NOT AT ALL

## 2025-01-20 ASSESSMENT — PAIN SCALES - GENERAL: PAINLEVEL_OUTOF10: 0-NO PAIN

## 2025-01-20 NOTE — PROGRESS NOTES
Subjective   Harper Diana is a 79 y.o. female who presents for Follow-up (Follow up visit, patient complains of no appetite, confusion in the a.m. ).    HPI  : Patient is a 79-year-old female who was brought in by her daughter since the patient does have dementia with anxiety.  She has been stable recently with taking 1 alprazolam daily as needed but now that the winter weather is hit she is isolated indoors and has more anxiety than previous.  She will need to take at least 2 alprazolam as needed and I will have her sign the E consent and I will review her OARRS report.  Patient also follows with a psychiatrist and dementia specialist and she is taking her Aricept as ordered.  Daughter is somewhat concerned and her mother is losing weight but her weight is actually the same as it was back in May.    Objective   : ROS : 10 systems were reviewed and the information is included in the HPI and no additional review of systems is indicated.    Physical Exam  Vitals and nursing note reviewed.   Constitutional:       Appearance: Normal appearance.      Comments: Patient is alert and oriented x3.   No acute distress   HENT:      Head: Normocephalic.      Right Ear: Tympanic membrane and external ear normal.      Left Ear: Tympanic membrane and external ear normal.      Ears:      Comments: Ears are patent bilaterally and TMs are clear.     Nose: Nose normal.      Mouth/Throat:      Mouth: Mucous membranes are moist.      Pharynx: Oropharynx is clear.      Comments: Mouth is moist, tongue is midline.  No posterior pharyngeal erythema.  Eyes:      Extraocular Movements: Extraocular movements intact.      Conjunctiva/sclera: Conjunctivae normal.      Pupils: Pupils are equal, round, and reactive to light.      Comments: No visual disturbance, does have her eyes examined once a year.   Neck:      Comments: Restricted range of motion cervical spine due to arthritis and spasm.  No carotid bruits, no thyromegaly, no cervical  adenopathy.    Cardiovascular:      Rate and Rhythm: Normal rate and regular rhythm.      Pulses: Normal pulses.      Heart sounds: Normal heart sounds.      Comments: Patient denies chest pain and no palpitations.  Heart rhythm is stable S1 and S2 are noted, no ectopics.  Pulmonary:      Effort: Pulmonary effort is normal.      Breath sounds: Rhonchi present.      Comments: Lungs  with few rhonchi  from Tobacco.  Patient states she cannot quit tobacco use.  Abdominal:      General: Bowel sounds are normal.      Palpations: Abdomen is soft.      Comments: Abdomen is soft and non tender.  Patient does have a history of treated colon cancer.  No flank tenderness.  No suprapubic pain.  Positive bowel sounds .  No abdominal guarding and no rebound tenderness.   Genitourinary:     Comments: Patient denies dysuria, no hematuria, no nocturia, denies flank pain.  Musculoskeletal:         General: Tenderness present. Normal range of motion.      Cervical back: Normal range of motion.      Comments: Patient does have chronic lumbosacral degenerative disc disease and occasional sciatica.  Age-related arthritis in the joints.  Restriction of motion cervical and lumbar spines due to arthritis, and muscle spasm.   Skin:     General: Skin is warm.      Comments: There is no bruising, no erythema, no skin lesions noted, no rashes.   Neurological:      General: No focal deficit present.      Mental Status: She is alert and oriented to person, place, and time. Mental status is at baseline.      Sensory: Sensory deficit present.      Comments: Patient does have occasional sciatica and paresthesias from lumbosacral disc disease.  No focal neurosensory deficits are noted.  Decreased muscle strength upper and lower extremities.  Also history of previous multiple lacunar infarcts but no major CVA.  She has been evaluated by neurology and psychiatry.   Psychiatric:         Behavior: Behavior normal.         Thought Content: Thought content  normal.         Judgment: Judgment normal.      Comments: Patient does have dementia with secondary anxiety and I will increase her alprazolam to twice daily.  She also will continue on the Aricept and she does follow with psychiatry.  As I discussed with her daughter she does need to be more social and possibly they could enroll her in a type of  where she could be socialized.     PLAN : Patient is a 79-year-old elderly female with dementia and anxiety brought in by her daughter for review of medication and also increase on her alprazolam for panic and anxiety.  She does also see a psychiatrist and she has had extensive testing done.  She does seem to need more socialization especially now in the wintertime when she is isolated at home and not going outdoors much.  I did have a talk with the daughter and hopefully some of the children can take her out of the home periodically and even out to dinner.  They will also see if there are some  is that she might be able to go to to increase her socialization.  Patient did sign the E consent form and I reviewed her OARRS report and she will follow-up in 3 months for recheck.    Problem List Items Addressed This Visit    None  Visit Diagnoses       Anxiety and depression        Relevant Medications    ALPRAZolam (Xanax) 0.25 mg tablet                 Bassem Landaverde DO

## 2025-02-25 DIAGNOSIS — F32.A DEPRESSION, UNSPECIFIED: ICD-10-CM

## 2025-02-25 DIAGNOSIS — F01.B4 MODERATE VASCULAR DEMENTIA WITH ANXIETY: ICD-10-CM

## 2025-02-25 DIAGNOSIS — I10 PRIMARY HYPERTENSION: ICD-10-CM

## 2025-02-25 RX ORDER — FLUOXETINE HYDROCHLORIDE 20 MG/1
CAPSULE ORAL
Qty: 180 CAPSULE | Refills: 1 | Status: SHIPPED | OUTPATIENT
Start: 2025-02-25

## 2025-02-25 RX ORDER — METOPROLOL SUCCINATE 25 MG/1
25 TABLET, EXTENDED RELEASE ORAL DAILY
Qty: 90 TABLET | Refills: 2 | Status: SHIPPED | OUTPATIENT
Start: 2025-02-25

## 2025-02-25 RX ORDER — DONEPEZIL HYDROCHLORIDE 5 MG/1
10 TABLET, FILM COATED ORAL NIGHTLY
Qty: 180 TABLET | Refills: 3 | Status: SHIPPED | OUTPATIENT
Start: 2025-02-25

## 2025-03-25 ENCOUNTER — APPOINTMENT (OUTPATIENT)
Dept: PRIMARY CARE | Facility: CLINIC | Age: 80
End: 2025-03-25
Payer: MEDICARE

## 2025-03-26 ENCOUNTER — DOCUMENTATION (OUTPATIENT)
Dept: PRIMARY CARE | Facility: CLINIC | Age: 80
End: 2025-03-26
Payer: MEDICARE

## 2025-03-26 NOTE — PROGRESS NOTES
Discharge Facility:[facility]  Discharge Diagnosis:Chest pain  Admission Date:3.24.25  Discharge Date: 3.25.25    PCP Appointment Date:3.26.25  Specialist Appointment Date:   Hospital Encounter and Summary Linked: Yes  Hospital Encounter      Appt within 2 business days

## 2025-03-26 NOTE — PROGRESS NOTES
Discharge Facility:[facility]  Discharge Diagnosis:Chest pain  Admission Date:3.24.25  Discharge Date: 3.25.25    PCP Appointment Date: 2 call attempts. Left message for dtr to call office for appt  Specialist Appointment Date:   Hospital Encounter and Summary Linked: Yes  Hospital Encounter    2 call attempts

## 2025-03-27 ENCOUNTER — TELEPHONE (OUTPATIENT)
Dept: PRIMARY CARE | Facility: CLINIC | Age: 80
End: 2025-03-27
Payer: MEDICARE

## 2025-03-27 NOTE — TELEPHONE ENCOUNTER
----- Message from Clover Mireles sent at 3/26/2025  2:18 PM EDT -----  Regarding: hosp dc  Discharge facility:[facility]  Discharge diagnosis:   chest pain  Date of discharge:      3.24.25  Unsuccessful attempts x2 to reach patient for PCP Follow-up  Please have office staff reach out to patient and schedule an appointment within 14 days from discharge date.

## 2025-04-08 ENCOUNTER — PATIENT OUTREACH (OUTPATIENT)
Dept: PRIMARY CARE | Facility: CLINIC | Age: 80
End: 2025-04-08
Payer: MEDICARE

## 2025-04-14 ENCOUNTER — APPOINTMENT (OUTPATIENT)
Dept: PRIMARY CARE | Facility: CLINIC | Age: 80
End: 2025-04-14
Payer: MEDICARE

## 2025-04-14 VITALS
HEIGHT: 63 IN | HEART RATE: 66 BPM | RESPIRATION RATE: 16 BRPM | DIASTOLIC BLOOD PRESSURE: 68 MMHG | BODY MASS INDEX: 24.8 KG/M2 | WEIGHT: 140 LBS | TEMPERATURE: 97.9 F | SYSTOLIC BLOOD PRESSURE: 120 MMHG | OXYGEN SATURATION: 96 %

## 2025-04-14 DIAGNOSIS — C18.7 MALIGNANT NEOPLASM OF SIGMOID COLON (MULTI): ICD-10-CM

## 2025-04-14 DIAGNOSIS — M19.042 LOCALIZED PRIMARY OSTEOARTHRITIS OF LEFT HAND: ICD-10-CM

## 2025-04-14 DIAGNOSIS — M77.8 THUMB TENDONITIS: ICD-10-CM

## 2025-04-14 DIAGNOSIS — E55.9 VITAMIN D DEFICIENCY: ICD-10-CM

## 2025-04-14 DIAGNOSIS — K50.10 CROHN'S DISEASE OF COLON WITHOUT COMPLICATION (MULTI): ICD-10-CM

## 2025-04-14 DIAGNOSIS — S63.502A SPRAIN OF LEFT WRIST, INITIAL ENCOUNTER: Primary | ICD-10-CM

## 2025-04-14 DIAGNOSIS — R53.83 MALAISE AND FATIGUE: ICD-10-CM

## 2025-04-14 DIAGNOSIS — F41.0 PANIC ATTACKS: ICD-10-CM

## 2025-04-14 DIAGNOSIS — F03.918 BEHAVIORAL AND PSYCHOLOGICAL SYMPTOMS OF DEMENTIA (MULTI): ICD-10-CM

## 2025-04-14 DIAGNOSIS — R53.81 MALAISE AND FATIGUE: ICD-10-CM

## 2025-04-14 DIAGNOSIS — I10 PRIMARY HYPERTENSION: ICD-10-CM

## 2025-04-14 DIAGNOSIS — I63.81 MULTIPLE LACUNAR INFARCTS (MULTI): ICD-10-CM

## 2025-04-14 DIAGNOSIS — F41.8 ANXIOUS DEPRESSION: ICD-10-CM

## 2025-04-14 DIAGNOSIS — E78.5 DYSLIPIDEMIA: ICD-10-CM

## 2025-04-14 PROCEDURE — 1159F MED LIST DOCD IN RCRD: CPT | Performed by: FAMILY MEDICINE

## 2025-04-14 PROCEDURE — 99214 OFFICE O/P EST MOD 30 MIN: CPT | Performed by: FAMILY MEDICINE

## 2025-04-14 PROCEDURE — 3074F SYST BP LT 130 MM HG: CPT | Performed by: FAMILY MEDICINE

## 2025-04-14 PROCEDURE — 1123F ACP DISCUSS/DSCN MKR DOCD: CPT | Performed by: FAMILY MEDICINE

## 2025-04-14 PROCEDURE — 1160F RVW MEDS BY RX/DR IN RCRD: CPT | Performed by: FAMILY MEDICINE

## 2025-04-14 PROCEDURE — 1126F AMNT PAIN NOTED NONE PRSNT: CPT | Performed by: FAMILY MEDICINE

## 2025-04-14 PROCEDURE — 3078F DIAST BP <80 MM HG: CPT | Performed by: FAMILY MEDICINE

## 2025-04-14 ASSESSMENT — PATIENT HEALTH QUESTIONNAIRE - PHQ9
1. LITTLE INTEREST OR PLEASURE IN DOING THINGS: NOT AT ALL
2. FEELING DOWN, DEPRESSED OR HOPELESS: SEVERAL DAYS
SUM OF ALL RESPONSES TO PHQ9 QUESTIONS 1 AND 2: 1

## 2025-04-14 ASSESSMENT — PAIN SCALES - GENERAL: PAINLEVEL_OUTOF10: 0-NO PAIN

## 2025-04-14 NOTE — PROGRESS NOTES
Subjective   Harper Diana is a 80 y.o. female who presents for hospital follow-up from March 24.    HPI  : Patient was at [facility]  for her  Psychiatric  visit,  and had paroxysmal atrial fibrillation  and was kept overnite for observation. She did have an Echocardiogram  and  had a continuous heart monitor.  Patient has follow up with Dr Worthy  for cardiology in about 3 weeks.  Today she is in for follow up on blood work and evaluation of left thumb and wrist pain.  Her daughter is with her and the patient does seem a little more forgetful than previously but she is happy the weather is improving so she can get out and work in the yard.  During the wintertime she does become more secluded in her house and becomes more forgetful.  She is looking forward to spring time now to be outdoors and more active.    Objective   : ROS : 10 systems were reviewed and the information is included in the HPI and no additional review of systems is indicated.    Physical Exam  Vitals and nursing note reviewed.   Constitutional:       Appearance: Normal appearance.      Comments: Patient is alert and oriented x3.   No acute distress   HENT:      Head: Normocephalic.      Right Ear: Tympanic membrane and external ear normal.      Left Ear: Tympanic membrane and external ear normal.      Ears:      Comments: Ears are patent bilaterally and TMs are clear.     Nose: Nose normal.      Mouth/Throat:      Mouth: Mucous membranes are dry.      Pharynx: Oropharynx is clear.      Comments: Mouth is dry , tongue is midline.  Mild posterior pharyngeal erythema.  Eyes:      Extraocular Movements: Extraocular movements intact.      Conjunctiva/sclera: Conjunctivae normal.      Pupils: Pupils are equal, round, and reactive to light.      Comments: No visual disturbance, does have her eyes examined once a year.   Neck:      Comments: No carotid bruits, no thyromegaly, no cervical adenopathy.  Occasional neck spasm and restriction of motion  secondary to stress and tension.  Cardiovascular:      Rate and Rhythm: Normal rate and regular rhythm.      Pulses: Normal pulses.      Heart sounds: Normal heart sounds.      Comments: Patient had an episode of paroxysmal atrial fibrillation but today is in sinus rhythm.  Patient denies chest pain . Heart rhythm is stable S1 and S2 are noted.  Patient does have her cardiology appointment in about 3 weeks.  I did review the echocardiogram she had at the [facility].  Pulmonary:      Effort: Pulmonary effort is normal.      Breath sounds: Rhonchi present.      Comments: Patient does continue to smoke cigarettes and has tried to cut down.  Lungs with bilateral scattered rhonchi to auscultation.  Abdominal:      General: Bowel sounds are normal.      Palpations: Abdomen is soft.      Comments: Abdomen is soft and mildly obese but nontender.  No flank tenderness.  No suprapubic pain.  Positive bowel sounds .  History of treated colon cancer 20 years ago.  Last colonoscopy was 2021.  No abdominal guarding and no rebound tenderness.   Genitourinary:     Comments: Patient denies dysuria, no hematuria, no nocturia, denies flank pain.  Musculoskeletal:         General: Tenderness present. Normal range of motion.      Cervical back: Normal range of motion.      Comments: Chronic lumbosacral degenerative disc disease but currently stable.  Occasional sciatica.  Age-related arthritis in the joints.  Restriction of motion cervical and lumbar spines due to arthritis.     Skin:     General: Skin is warm.      Comments: There is no bruising, no erythema, no skin lesions noted, no rashes.   Neurological:      General: No focal deficit present.      Mental Status: She is alert and oriented to person, place, and time. Mental status is at baseline.      Sensory: Sensory deficit present.      Comments: No focal neurosensory deficits are noted.  Peripheral neuropathy and paresthesias from lumbosacral disc disease.  Coordination and  gait are stable.  Normal muscle strength upper and lower extremities.   Psychiatric:         Behavior: Behavior normal.         Thought Content: Thought content normal.         Judgment: Judgment normal.      Comments: Patient has chronic depression which has been treated by psychiatry.  She is becoming more forgetful.    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  Memory is poor.      PLAN : Patient is an 80-year-old female who was evaluated today with her daughter present.  She did have recheck on blood work and I did review her echocardiogram and results from the [facility] on March 24.  She has a follow-up appointment with Dr. Worthy her cardiologist at [place] in 3 to 4 weeks.  Patient is getting outdoors more now that the weather has improved but she still has chronic depression and vascular dementia.  She does live with her son who dispenses her medications and she still has anxiety and panic attacks.  I did have a long talk with the patient and her daughter and we will wait and see what cardiology has to say about her paroxysmal atrial fibrillation and also review of her recent event monitor.  Patient will follow-up in September or sooner as needed pending the blood work results.  Patient was also referred to orthopedics regarding her left thumb and wrist sprain.    Problem List Items Addressed This Visit       Malaise and fatigue    Relevant Orders    CBC    Comprehensive Metabolic Panel    Lipid Panel    Thyroid Stimulating Hormone    Vitamin D deficiency    Relevant Orders    Vitamin D 25-Hydroxy,Total (for eval of Vitamin D levels)    Primary hypertension    Relevant Orders    CBC    Comprehensive Metabolic Panel    Lipid Panel    Thyroid Stimulating Hormone     Other Visit Diagnoses       Dyslipidemia        Relevant Orders    CBC    Comprehensive Metabolic Panel    Lipid Panel    Thyroid Stimulating Hormone                 Bassem Landaverde,

## 2025-04-15 LAB
25(OH)D3+25(OH)D2 SERPL-MCNC: 26 NG/ML (ref 30–100)
ALBUMIN SERPL-MCNC: 4.5 G/DL (ref 3.6–5.1)
ALP SERPL-CCNC: 68 U/L (ref 37–153)
ALT SERPL-CCNC: 10 U/L (ref 6–29)
ANION GAP SERPL CALCULATED.4IONS-SCNC: 12 MMOL/L (CALC) (ref 7–17)
AST SERPL-CCNC: 16 U/L (ref 10–35)
BILIRUB SERPL-MCNC: 0.8 MG/DL (ref 0.2–1.2)
BUN SERPL-MCNC: 15 MG/DL (ref 7–25)
CALCIUM SERPL-MCNC: 9.2 MG/DL (ref 8.6–10.4)
CHLORIDE SERPL-SCNC: 105 MMOL/L (ref 98–110)
CHOLEST SERPL-MCNC: 163 MG/DL
CHOLEST/HDLC SERPL: 3 (CALC)
CO2 SERPL-SCNC: 26 MMOL/L (ref 20–32)
CREAT SERPL-MCNC: 0.95 MG/DL (ref 0.6–0.95)
EGFRCR SERPLBLD CKD-EPI 2021: 61 ML/MIN/1.73M2
ERYTHROCYTE [DISTWIDTH] IN BLOOD BY AUTOMATED COUNT: 12.7 % (ref 11–15)
GLUCOSE SERPL-MCNC: 73 MG/DL (ref 65–99)
HCT VFR BLD AUTO: 44.3 % (ref 35–45)
HDLC SERPL-MCNC: 54 MG/DL
HGB BLD-MCNC: 14.7 G/DL (ref 11.7–15.5)
LDLC SERPL CALC-MCNC: 82 MG/DL (CALC)
MCH RBC QN AUTO: 34.3 PG (ref 27–33)
MCHC RBC AUTO-ENTMCNC: 33.2 G/DL (ref 32–36)
MCV RBC AUTO: 103.3 FL (ref 80–100)
NONHDLC SERPL-MCNC: 109 MG/DL (CALC)
PLATELET # BLD AUTO: 231 THOUSAND/UL (ref 140–400)
PMV BLD REES-ECKER: 11.7 FL (ref 7.5–12.5)
POTASSIUM SERPL-SCNC: 4.2 MMOL/L (ref 3.5–5.3)
PROT SERPL-MCNC: 6.7 G/DL (ref 6.1–8.1)
RBC # BLD AUTO: 4.29 MILLION/UL (ref 3.8–5.1)
SODIUM SERPL-SCNC: 143 MMOL/L (ref 135–146)
TRIGL SERPL-MCNC: 178 MG/DL
TSH SERPL-ACNC: 5.83 MIU/L (ref 0.4–4.5)
WBC # BLD AUTO: 6 THOUSAND/UL (ref 3.8–10.8)

## 2025-04-17 DIAGNOSIS — E03.9 ACQUIRED HYPOTHYROIDISM: Primary | ICD-10-CM

## 2025-04-17 DIAGNOSIS — E07.9 DISORDER OF THYROID, UNSPECIFIED: ICD-10-CM

## 2025-04-17 DIAGNOSIS — E55.9 VITAMIN D DEFICIENCY: ICD-10-CM

## 2025-04-17 RX ORDER — ERGOCALCIFEROL 1.25 MG/1
1.25 CAPSULE ORAL WEEKLY
Qty: 4 CAPSULE | Refills: 1 | Status: SHIPPED | OUTPATIENT
Start: 2025-04-17 | End: 2025-06-12

## 2025-04-17 RX ORDER — LEVOTHYROXINE SODIUM 50 UG/1
50 TABLET ORAL DAILY
Qty: 90 TABLET | Refills: 3 | Status: SHIPPED | OUTPATIENT
Start: 2025-04-17 | End: 2026-04-17

## 2025-04-17 NOTE — RESULT ENCOUNTER NOTE
Red and white blood cell counts are normal         cholesterol is normal at 163        triglycerides are 178 and should be under 150      just watch the fats in the diet.  Thyroid is off a little bit         so I will increase her thyroid pill to 50 mcg daily.       That might give her more energy.           Vitamin D is low at 26 and should be above 30      I will send in some prescription vitamin D to take once per week      and she should also take over-the-counter vitamin D3 1000 units daily        blood sugar, kidney and liver function are normal              so take extra vitamin D and I will increase the thyroid pill        Other blood work is stable

## 2025-04-21 PROBLEM — L25.5 RHUS DERMATITIS: Status: RESOLVED | Noted: 2023-03-08 | Resolved: 2025-04-21

## 2025-04-21 PROBLEM — I34.1 MITRAL VALVE PROLAPSE: Status: ACTIVE | Noted: 2022-11-02

## 2025-04-21 PROBLEM — I20.9 ANGINA PECTORIS: Status: ACTIVE | Noted: 2025-04-21

## 2025-04-21 PROBLEM — K50.10: Status: RESOLVED | Noted: 2023-03-08 | Resolved: 2025-04-21

## 2025-04-23 ENCOUNTER — TELEPHONE (OUTPATIENT)
Dept: PRIMARY CARE | Facility: CLINIC | Age: 80
End: 2025-04-23

## 2025-04-23 ENCOUNTER — OFFICE VISIT (OUTPATIENT)
Dept: ORTHOPEDIC SURGERY | Facility: CLINIC | Age: 80
End: 2025-04-23
Payer: MEDICARE

## 2025-04-23 VITALS — WEIGHT: 140 LBS | BODY MASS INDEX: 24.8 KG/M2 | HEIGHT: 63 IN

## 2025-04-23 DIAGNOSIS — M79.645 PAIN OF LEFT THUMB: Primary | ICD-10-CM

## 2025-04-23 DIAGNOSIS — M18.12 PRIMARY OSTEOARTHRITIS OF FIRST CARPOMETACARPAL JOINT OF LEFT HAND: ICD-10-CM

## 2025-04-23 PROCEDURE — 1123F ACP DISCUSS/DSCN MKR DOCD: CPT | Performed by: ORTHOPAEDIC SURGERY

## 2025-04-23 PROCEDURE — 99213 OFFICE O/P EST LOW 20 MIN: CPT | Mod: 25 | Performed by: ORTHOPAEDIC SURGERY

## 2025-04-23 PROCEDURE — 20600 DRAIN/INJ JOINT/BURSA W/O US: CPT | Mod: LT | Performed by: ORTHOPAEDIC SURGERY

## 2025-04-23 PROCEDURE — 2500000004 HC RX 250 GENERAL PHARMACY W/ HCPCS (ALT 636 FOR OP/ED): Performed by: ORTHOPAEDIC SURGERY

## 2025-04-23 PROCEDURE — 1159F MED LIST DOCD IN RCRD: CPT | Performed by: ORTHOPAEDIC SURGERY

## 2025-04-23 PROCEDURE — 99203 OFFICE O/P NEW LOW 30 MIN: CPT | Performed by: ORTHOPAEDIC SURGERY

## 2025-04-23 PROCEDURE — 1160F RVW MEDS BY RX/DR IN RCRD: CPT | Performed by: ORTHOPAEDIC SURGERY

## 2025-04-23 RX ORDER — LIDOCAINE HYDROCHLORIDE 20 MG/ML
0.5 INJECTION, SOLUTION INFILTRATION; PERINEURAL
Status: COMPLETED | OUTPATIENT
Start: 2025-04-23 | End: 2025-04-23

## 2025-04-23 RX ORDER — TRIAMCINOLONE ACETONIDE 40 MG/ML
20 INJECTION, SUSPENSION INTRA-ARTICULAR; INTRAMUSCULAR
Status: COMPLETED | OUTPATIENT
Start: 2025-04-23 | End: 2025-04-23

## 2025-04-23 RX ADMIN — TRIAMCINOLONE ACETONIDE 20 MG: 40 INJECTION, SUSPENSION INTRA-ARTICULAR; INTRAMUSCULAR at 10:27

## 2025-04-23 RX ADMIN — LIDOCAINE HYDROCHLORIDE 0.5 ML: 20 INJECTION, SOLUTION INFILTRATION; PERINEURAL at 10:27

## 2025-04-23 NOTE — TELEPHONE ENCOUNTER
Neena from  [employer] called regarding pt results and would like to speak to you Ref #[id number]  Thank you

## 2025-04-23 NOTE — PROGRESS NOTES
Subjective    Patient ID: Harper Diana is a 80 y.o. female.    Chief Complaint: Injury of the Left Wrist (Pt fell off her stoop bracing her fall with her LUE 3/3/2025/)     Last Surgery: No surgery found  Last Surgery Date: No surgery found    Left Wrist       The patient is an 80-year-old right-hand-dominant female who comes in with a complaint of pain near her left thumb.  Her pain was exacerbated with a fall onto her outstretched left upper extremity that occurred in March of this year.  X-rays of her left wrist were obtained at that time.  There was no evidence of any fracture.  However the x-rays did show severe arthritis at the first CMC joint.  The patient will wear a cock up wrist splint to help with her symptoms.    Objective   Ortho Exam  The patient is in no acute distress.  Exam of her left hand and wrist reveals her skin envelope is intact.  She is tender at the first CMC joint.  She has evidence of first metacarpal subluxation.  She has a positive first CMC grind.  She has a negative Finkelstein test.    Image Results:  X-rays of the patient's left wrist from the outside hospital were personally reviewed.  They show no fracture dislocation but severe arthritis at the first CMC joint and the thumb MCP joint.    Assessment/Plan   Encounter Diagnoses:  Pain of left thumb    Primary osteoarthritis of first carpometacarpal joint of left hand    The patient has evidence of left first CMC arthritis.  She would like to treat this symptomatically.  She elected undergo a Kenalog injection today.    S Inj/Asp: L thumb CMC on 4/23/2025 10:27 AM  Indications: pain  Details: 25 G needle, dorsal approach  Medications: 20 mg triamcinolone acetonide 40 mg/mL; 0.5 mL lidocaine 20 mg/mL (2 %)  Procedure, treatment alternatives, risks and benefits explained, specific risks discussed. Consent was given by the patient. Immediately prior to procedure a time out was called to verify the correct patient, procedure,  equipment, support staff and site/side marked as required. Patient was prepped and draped in the usual sterile fashion.       The patient was informed to takes about a week for the injection of an effect.  She otherwise will follow-up as her symptoms dictate.

## 2025-04-28 ENCOUNTER — TELEPHONE (OUTPATIENT)
Dept: PRIMARY CARE | Facility: CLINIC | Age: 80
End: 2025-04-28
Payer: MEDICARE

## 2025-05-06 ENCOUNTER — OFFICE VISIT (OUTPATIENT)
Dept: CARDIOLOGY | Facility: CLINIC | Age: 80
End: 2025-05-06
Payer: MEDICARE

## 2025-05-06 VITALS
BODY MASS INDEX: 24.19 KG/M2 | WEIGHT: 136.5 LBS | OXYGEN SATURATION: 98 % | HEIGHT: 63 IN | SYSTOLIC BLOOD PRESSURE: 90 MMHG | HEART RATE: 76 BPM | DIASTOLIC BLOOD PRESSURE: 66 MMHG

## 2025-05-06 DIAGNOSIS — I65.29 STENOSIS OF CAROTID ARTERY, UNSPECIFIED LATERALITY: ICD-10-CM

## 2025-05-06 DIAGNOSIS — I49.5 TACHY-BRADY SYNDROME (MULTI): ICD-10-CM

## 2025-05-06 DIAGNOSIS — I34.1 MITRAL VALVE PROLAPSE: ICD-10-CM

## 2025-05-06 DIAGNOSIS — I48.0 PAROXYSMAL ATRIAL FIBRILLATION (MULTI): Primary | ICD-10-CM

## 2025-05-06 DIAGNOSIS — R00.1 SINUS BRADYCARDIA: ICD-10-CM

## 2025-05-06 DIAGNOSIS — I25.10 CORONARY ARTERY DISEASE INVOLVING NATIVE CORONARY ARTERY OF NATIVE HEART WITHOUT ANGINA PECTORIS: ICD-10-CM

## 2025-05-06 DIAGNOSIS — I10 PRIMARY HYPERTENSION: ICD-10-CM

## 2025-05-06 DIAGNOSIS — R00.2 PALPITATIONS: ICD-10-CM

## 2025-05-06 DIAGNOSIS — E78.5 DYSLIPIDEMIA: ICD-10-CM

## 2025-05-06 PROBLEM — R07.89 CHEST TIGHTNESS: Status: RESOLVED | Noted: 2023-03-08 | Resolved: 2025-05-06

## 2025-05-06 PROBLEM — I20.9 ANGINA PECTORIS: Status: RESOLVED | Noted: 2025-04-21 | Resolved: 2025-05-06

## 2025-05-06 PROCEDURE — 99215 OFFICE O/P EST HI 40 MIN: CPT | Performed by: INTERNAL MEDICINE

## 2025-05-06 PROCEDURE — 3074F SYST BP LT 130 MM HG: CPT | Performed by: INTERNAL MEDICINE

## 2025-05-06 PROCEDURE — 1159F MED LIST DOCD IN RCRD: CPT | Performed by: INTERNAL MEDICINE

## 2025-05-06 PROCEDURE — 99213 OFFICE O/P EST LOW 20 MIN: CPT | Performed by: INTERNAL MEDICINE

## 2025-05-06 PROCEDURE — 3078F DIAST BP <80 MM HG: CPT | Performed by: INTERNAL MEDICINE

## 2025-05-06 RX ORDER — AMIODARONE HYDROCHLORIDE 200 MG/1
200 TABLET ORAL DAILY
Qty: 30 TABLET | Refills: 5 | Status: SHIPPED | OUTPATIENT
Start: 2025-05-06 | End: 2025-11-02

## 2025-05-06 ASSESSMENT — ENCOUNTER SYMPTOMS
DEPRESSION: 1
PALPITATIONS: 1

## 2025-05-06 NOTE — PATIENT INSTRUCTIONS
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    You have paroxysmal atrial fibrillation which causes your heart to race.    Stop metoprolol    Start amiodarone 200 mg daily    Start Eliquis 5 mg twice daily.    For smoking cessation, see the [facility].  [phone]    Return in 3 weeks for a 48 hour monitor.    We will see you back in 5-6 weeks.

## 2025-05-06 NOTE — PROGRESS NOTES
"Subjective   Harper Diana is a 80 y.o. female.    Chief Complaint:  Hospital follow-up for rapid heartbeat.    HPI    She presents after hospital follow-up.  She presented to the hospital with chest pain.  During her hospitalization she was noted to have atrial fibrillation with a rapid ventricular response with heart rates of about 150.  She was discharged on beta-blocker therapy.  No anticoagulation was started.  Currently she notes episodes of palpitations.  She has a very difficult time describing her chest pain.  It appears to have been midsternal.  Cannot give me any details or duration of the chest discomfort.    Her cardiac history is significant for coronary artery disease based on a CT calcium score 31.  This is consistent with minimal coronary disease.    Past medical history: Significant for degenerative arthritis.  Reported to have abdominal aortic aneurysm.  History of smoking abuse.  History of gastroesophageal reflux.  History of depression.    Allergies to medications: None known.    Social history: Current smoker.  She is .    Family history: Family history of hypertension and multiple family members.    Review of Systems   Cardiovascular:  Positive for chest pain and palpitations.   Musculoskeletal:  Positive for arthritis and joint pain.   Psychiatric/Behavioral:  Positive for depression.        Current Medications[1]     Visit Vitals  BP 90/66 (BP Location: Left arm)   Pulse 76   Ht 1.6 m (5' 3\")   Wt 61.9 kg (136 lb 8 oz)   SpO2 98%   BMI 24.18 kg/m²   OB Status Postmenopausal   Smoking Status Every Day   BSA 1.66 m²        Objective     Constitutional:       Appearance: Not in distress.   Neck:      Vascular: JVD normal.   Pulmonary:      Breath sounds: Normal breath sounds.   Cardiovascular:      Normal rate. Regularly irregular rhythm. Normal S1. Normal S2.       Murmurs: There is no murmur.      No gallop.    Pulses:     Intact distal pulses.   Edema:     Peripheral edema absent. "   Abdominal:      General: There is no distension.      Palpations: Abdomen is soft.   Neurological:      Mental Status: Alert.         Lab Review:   Lab Results   Component Value Date     04/14/2025    K 4.2 04/14/2025     04/14/2025    CO2 26 04/14/2025    BUN 15 04/14/2025    CREATININE 0.95 04/14/2025    GLUCOSE 73 04/14/2025    CALCIUM 9.2 04/14/2025       Assessment:    1.  Paroxysmal atrial fibrillation.  By exam is in atrial fibrillation.  Patient did have a 2-week Holter monitor.  We reviewed the results.  This shows episodes of bradycardia with heart rates as low as 30.  Also episodes of tachycardia with heart rates of 150-170.  This makes it very difficult to treat this patient.  Will discontinue metoprolol and start amiodarone.  We will also start Eliquis 5 mg twice daily.  We are going to have her return in 3 weeks for a 48-hour Holter monitor.  Will make some decision as to what the next step may be.  It may be necessary to place a permanent pacemaker depending on what her Holter monitor shows.  At that point we can then use any medications.    2.  Coronary artery disease.  Chest pain of unclear etiology.  Low risk for an acute coronary event.    3.  Shortness of breath.  Secondary to underlying chronic lung disease.    An echocardiographic study done at her hospitalization at the [facility] demonstrated normal left ventricular function with no significant valvular heart disease.  Patient had basal septal hypertrophy which may be a result of her torquing of the aorta.         [1]   Current Outpatient Medications   Medication Sig Dispense Refill    ALPRAZolam (Xanax) 0.25 mg tablet TAKE 1 TABLET BY MOUTH EVERY 8 to  12 hrs  AS NEEDED FOR PANIC ATTACKS 60 tablet 3    dextromethorphan-bupropion (Auvelity)  mg tablet, IR and ER, biphasic Take 1 tablet by mouth once daily. 90 tablet 3    donepezil (Aricept) 5 mg tablet TAKE 2 TABLETS (10 MG) BY MOUTH ONCE DAILY AT BEDTIME. 180 tablet  3    ergocalciferol (Vitamin D-2) 1250 mcg (50,000 units) capsule Take 1 capsule (1.25 mg) by mouth 1 (one) time per week. 4 capsule 1    FLUoxetine (PROzac) 20 mg capsule START WITH 1 CAPSULE DAILY FOR 2 WEEKS, AND THEN CAN INCREASE TO 2 CAPSULES DAILY. 180 capsule 1    levothyroxine (Synthroid, Levoxyl) 50 mcg tablet Take 1 tablet (50 mcg) by mouth early in the morning.. Take on an empty stomach at the same time each day, either 30 to 60 minutes prior to breakfast 90 tablet 3    metoprolol succinate XL (Toprol-XL) 25 mg 24 hr tablet TAKE 1 TABLET (25 MG) BY MOUTH DAILY DO NOT CRUSH OR CHEW 90 tablet 2    amiodarone (Pacerone) 200 mg tablet Take 1 tablet (200 mg) by mouth once daily. 30 tablet 5    apixaban (Eliquis) 5 mg tablet Take 1 tablet (5 mg) by mouth 2 times a day. 60 tablet 11     No current facility-administered medications for this visit.

## 2025-05-08 ENCOUNTER — PATIENT OUTREACH (OUTPATIENT)
Dept: PRIMARY CARE | Facility: CLINIC | Age: 80
End: 2025-05-08
Payer: MEDICARE

## 2025-05-08 NOTE — PROGRESS NOTES
Unable to reach patient for one month post discharge follow up call.                LVM with call back number for patient to call if needed     If no voicemail available call attempts x 2 were made to contact the patient to    assist with  any questions or concerns patient may have.

## 2025-05-22 PROBLEM — H91.90 HEARING DIFFICULTY: Status: RESOLVED | Noted: 2023-03-08 | Resolved: 2025-05-22

## 2025-05-22 PROBLEM — H69.93 DYSFUNCTION OF BOTH EUSTACHIAN TUBES: Status: RESOLVED | Noted: 2023-03-08 | Resolved: 2025-05-22

## 2025-05-22 PROBLEM — R00.1 SINUS BRADYCARDIA: Status: RESOLVED | Noted: 2025-05-06 | Resolved: 2025-05-22

## 2025-05-22 PROBLEM — J02.9 PHARYNGITIS: Status: RESOLVED | Noted: 2023-03-08 | Resolved: 2025-05-22

## 2025-05-27 ENCOUNTER — HOSPITAL ENCOUNTER (OUTPATIENT)
Dept: CARDIOLOGY | Facility: CLINIC | Age: 80
Discharge: HOME | End: 2025-05-27
Payer: MEDICARE

## 2025-05-27 DIAGNOSIS — I48.0 PAROXYSMAL ATRIAL FIBRILLATION (MULTI): ICD-10-CM

## 2025-05-27 DIAGNOSIS — R00.2 PALPITATIONS: ICD-10-CM

## 2025-05-27 PROCEDURE — 93225 XTRNL ECG REC<48 HRS REC: CPT

## 2025-05-29 ENCOUNTER — TELEPHONE (OUTPATIENT)
Dept: CARDIOLOGY | Facility: CLINIC | Age: 80
End: 2025-05-29
Payer: MEDICARE

## 2025-05-29 DIAGNOSIS — I48.0 PAROXYSMAL ATRIAL FIBRILLATION (MULTI): ICD-10-CM

## 2025-05-29 NOTE — TELEPHONE ENCOUNTER
Daughter Patrciia reached out to our office that her Mom can not afford ELIQUIS and already used 30-day free trial card that we gave her.  Sent RX to  [facility] and emailed Tammy Gutierrez of  [facility] to help speed up a process for patient assistance

## 2025-05-29 NOTE — TELEPHONE ENCOUNTER
Daughter Patricia reached out to our office that her Mom can not afford ELIQUIS and already used 30-day free trial card that we gave her.  Sent RX to  [facility] and emailed Tammy Gutierrez of  [facility] to help speed up a process for patient assistance

## 2025-06-04 ENCOUNTER — APPOINTMENT (OUTPATIENT)
Dept: PRIMARY CARE | Facility: CLINIC | Age: 80
End: 2025-06-04
Payer: MEDICARE

## 2025-06-10 DIAGNOSIS — I48.0 PAROXYSMAL ATRIAL FIBRILLATION (MULTI): Primary | ICD-10-CM

## 2025-06-11 PROCEDURE — RXMED WILLOW AMBULATORY MEDICATION CHARGE

## 2025-06-11 RX ORDER — AMIODARONE HYDROCHLORIDE 200 MG/1
200 TABLET ORAL DAILY
Qty: 90 TABLET | Refills: 3 | Status: SHIPPED | OUTPATIENT
Start: 2025-06-11

## 2025-06-12 ENCOUNTER — PHARMACY VISIT (OUTPATIENT)
Dept: PHARMACY | Facility: CLINIC | Age: 80
End: 2025-06-12
Payer: MEDICARE

## 2025-06-16 ASSESSMENT — ENCOUNTER SYMPTOMS
PALPITATIONS: 1
DEPRESSION: 1

## 2025-06-17 ENCOUNTER — OFFICE VISIT (OUTPATIENT)
Dept: CARDIOLOGY | Facility: CLINIC | Age: 80
End: 2025-06-17
Payer: MEDICARE

## 2025-06-17 VITALS
DIASTOLIC BLOOD PRESSURE: 70 MMHG | SYSTOLIC BLOOD PRESSURE: 110 MMHG | WEIGHT: 135 LBS | BODY MASS INDEX: 23.92 KG/M2 | HEIGHT: 63 IN | HEART RATE: 101 BPM | OXYGEN SATURATION: 97 %

## 2025-06-17 DIAGNOSIS — E78.5 DYSLIPIDEMIA: ICD-10-CM

## 2025-06-17 DIAGNOSIS — I71.43 INFRARENAL ABDOMINAL AORTIC ANEURYSM (AAA) WITHOUT RUPTURE: ICD-10-CM

## 2025-06-17 DIAGNOSIS — I34.1 MITRAL VALVE PROLAPSE: ICD-10-CM

## 2025-06-17 DIAGNOSIS — I10 PRIMARY HYPERTENSION: ICD-10-CM

## 2025-06-17 DIAGNOSIS — R00.2 PALPITATIONS: ICD-10-CM

## 2025-06-17 DIAGNOSIS — I49.5 TACHY-BRADY SYNDROME (MULTI): Primary | ICD-10-CM

## 2025-06-17 DIAGNOSIS — I25.10 CORONARY ARTERY DISEASE INVOLVING NATIVE CORONARY ARTERY OF NATIVE HEART WITHOUT ANGINA PECTORIS: ICD-10-CM

## 2025-06-17 DIAGNOSIS — I65.29 STENOSIS OF CAROTID ARTERY, UNSPECIFIED LATERALITY: ICD-10-CM

## 2025-06-17 DIAGNOSIS — I48.0 PAROXYSMAL ATRIAL FIBRILLATION (MULTI): ICD-10-CM

## 2025-06-17 PROBLEM — I71.40 AAA (ABDOMINAL AORTIC ANEURYSM): Status: RESOLVED | Noted: 2023-03-08 | Resolved: 2025-06-17

## 2025-06-17 PROCEDURE — 3074F SYST BP LT 130 MM HG: CPT | Performed by: INTERNAL MEDICINE

## 2025-06-17 PROCEDURE — 1159F MED LIST DOCD IN RCRD: CPT | Performed by: INTERNAL MEDICINE

## 2025-06-17 PROCEDURE — 99212 OFFICE O/P EST SF 10 MIN: CPT | Performed by: INTERNAL MEDICINE

## 2025-06-17 PROCEDURE — 99213 OFFICE O/P EST LOW 20 MIN: CPT | Performed by: INTERNAL MEDICINE

## 2025-06-17 PROCEDURE — 3078F DIAST BP <80 MM HG: CPT | Performed by: INTERNAL MEDICINE

## 2025-06-17 NOTE — PROGRESS NOTES
"Subjective   Harper Diana is a 80 y.o. female.    Chief Complaint:  Follow-up paroxysmal atrial fibrillation.    HPI    On her last visit we noted that she had episodes of marked bradycardia with heart rates in the 30s along with episodes of tachycardia on beta-blocker therapy.  We decided to discontinue beta-blocker therapy and we placed her on amiodarone 200 mg daily.  She is tolerating the medications well.  She notes rare episodes of palpitations.  She tells me that she is active at home and works outside.  Denies chest pressure or heaviness.  No sustained arrhythmias.    Her cardiac history is significant for coronary artery disease based on a CT calcium score 31.  This is consistent with minimal coronary disease.    Past medical history: Significant for degenerative arthritis.  Reported to have abdominal aortic aneurysm.  History of smoking abuse.  History of gastroesophageal reflux.  History of depression.    Allergies to medications: None known.    Social history: Current smoker.  She is .    Family history: Family history of hypertension and multiple family members.    Review of Systems   Cardiovascular:  Positive for chest pain and palpitations.   Musculoskeletal:  Positive for arthritis and joint pain.   Psychiatric/Behavioral:  Positive for depression.        Current Medications[1]     Visit Vitals  /70 (BP Location: Left arm)   Pulse 101   Ht 1.6 m (5' 3\")   Wt 61.2 kg (135 lb)   SpO2 97%   BMI 23.91 kg/m²   OB Status Postmenopausal   Smoking Status Every Day   BSA 1.65 m²        Objective     Constitutional:       Appearance: Not in distress.   Neck:      Vascular: JVD normal.   Pulmonary:      Breath sounds: Normal breath sounds.   Cardiovascular:      Normal rate. Irregularly irregular rhythm. Normal S1. Normal S2.       Murmurs: There is no murmur.      No gallop.    Pulses:     Intact distal pulses.   Edema:     Peripheral edema absent.   Abdominal:      General: There is no " distension.      Palpations: Abdomen is soft.   Neurological:      Mental Status: Alert.         Lab Review:   Lab Results   Component Value Date     04/14/2025    K 4.2 04/14/2025     04/14/2025    CO2 26 04/14/2025    BUN 15 04/14/2025    CREATININE 0.95 04/14/2025    GLUCOSE 73 04/14/2025    CALCIUM 9.2 04/14/2025       Assessment:    1.  Paroxysmal atrial fibrillation.  By exam is in atrial fibrillation with a controlled ventricular response.  Her Holter monitor done from May 27 May 2019 demonstrated paroxysmal atrial fibrillation with a burden of 7%.  Not sure which way to go at this point in time.  Will refer to electrophysiology service for their evaluation.  She is currently on amiodarone 200 mg daily and Eliquis 5 mg twice daily.    2.  Coronary artery disease.  Mild by prior evaluation.  Has some atypical chest pain symptoms.    3.  Exertional dyspnea.  Secondary to underlying chronic lung disease.    Patient's latest echo study showed normal left ventricular function with no significant valvular heart disease.  Mild basal septal hypertrophy.       [1]   Current Outpatient Medications   Medication Sig Dispense Refill    ALPRAZolam (Xanax) 0.25 mg tablet TAKE 1 TABLET BY MOUTH EVERY 8 to  12 hrs  AS NEEDED FOR PANIC ATTACKS 60 tablet 3    amiodarone (Pacerone) 200 mg tablet TAKE 1 TABLET BY MOUTH EVERY DAY 90 tablet 3    apixaban (Eliquis) 5 mg tablet Take 1 tablet (5 mg) by mouth 2 times a day. 60 tablet 11    dextromethorphan-bupropion (Auvelity)  mg tablet, IR and ER, biphasic Take 1 tablet by mouth once daily. 90 tablet 3    donepezil (Aricept) 5 mg tablet TAKE 2 TABLETS (10 MG) BY MOUTH ONCE DAILY AT BEDTIME. 180 tablet 3    FLUoxetine (PROzac) 20 mg capsule START WITH 1 CAPSULE DAILY FOR 2 WEEKS, AND THEN CAN INCREASE TO 2 CAPSULES DAILY. 180 capsule 1    levothyroxine (Synthroid, Levoxyl) 50 mcg tablet Take 1 tablet (50 mcg) by mouth early in the morning.. Take on an empty stomach  at the same time each day, either 30 to 60 minutes prior to breakfast 90 tablet 3     No current facility-administered medications for this visit.

## 2025-06-17 NOTE — PATIENT INSTRUCTIONS
We will refer you to one of our electrophysiologists (Dr. Ramicone or Dr. Dahl)  to get their opinion on how best to treat you.    The mildly elevated triglyceride level is of no significance.

## 2025-06-18 ENCOUNTER — APPOINTMENT (OUTPATIENT)
Dept: PHARMACY | Facility: HOSPITAL | Age: 80
End: 2025-06-18
Payer: MEDICARE

## 2025-06-18 DIAGNOSIS — I48.0 PAROXYSMAL ATRIAL FIBRILLATION (MULTI): ICD-10-CM

## 2025-06-18 NOTE — Clinical Note
Florencia Worthy, Today's appointment was initial visit to establish care with Clinical Pharmacy. Appointment was completed with the patient's daughter, Patricia. She reports no abnormal bruising or bleeding with Eliquis. Will apply for  PAP for cost assistance and follow up in 1 month.

## 2025-06-18 NOTE — ASSESSMENT & PLAN NOTE
Harper continues on anticoagulation with Eliquis. No dosage adjustment required for age, weight, and renal function.

## 2025-06-18 NOTE — PROGRESS NOTES
"  Pharmacist Clinic: Cardiology Management    Harepr Diana is a 80 y.o. female was referred to Clinical Pharmacy Team for anticoagulation management.     Referring Provider: Lew Worthy MD    THIS IS A NEW PATIENT APPOINTMENT. PATIENT WILL BE ESTABLISHING CARE WITH CLINICAL PHARMACY.    Appointment was completed by the patient's daughter, Patricia, who was reached at .    REVIEW OF PAST APPNT (IF APPLICABLE):   N/A    Allergies Reviewed? Yes    Allergies[1]    Medical History[2]    Medications Ordered Prior to Encounter[3]      RELEVANT LAB RESULTS:  Lab Results   Component Value Date    BILITOT 0.8 04/14/2025    CALCIUM 9.2 04/14/2025    CO2 26 04/14/2025     04/14/2025    CREATININE 0.95 04/14/2025    GLUCOSE 73 04/14/2025    ALKPHOS 68 04/14/2025    K 4.2 04/14/2025    PROT 6.7 04/14/2025     04/14/2025    AST 16 04/14/2025    ALT 10 04/14/2025    BUN 15 04/14/2025    ANIONGAP 12 04/14/2025    ALBUMIN 4.5 04/14/2025    GFRF 78 06/20/2023     Lab Results   Component Value Date    TRIG 178 (H) 04/14/2025    CHOL 163 04/14/2025    LDLCALC 82 04/14/2025    HDL 54 04/14/2025     No results found for: \"BMCBC\", \"CBCDIF\"     PHARMACEUTICAL ASSESSMENT:    MEDICATION RECONCILIATION    Was a medication reconciliation completed at this visit? Yes  Home Pharmacy Reviewed? Yes, describe: [id number]    Added:  - None    Changed:  - Auvelity: taking as needed    Removed:  - None    Drug Interactions? Yes, describe: Dextromethorphan component of Auvelity may enhance serotonergic effects of fluoxetine. Recommended to use low starting doses of dextromethorphan and monitor for toxicities.     Medication Documentation Review Audit       Reviewed by Carlton AraizaD (Pharmacist) on 06/18/25 at 0844      Medication Order Taking? Sig Documenting Provider Last Dose Status   ALPRAZolam (Xanax) 0.25 mg tablet [id number] Yes TAKE 1 TABLET BY MOUTH EVERY 8 to  12 hrs  AS NEEDED FOR PANIC ATTACKS Bassem BALLARD " DO Saima  Active   amiodarone (Pacerone) 200 mg tablet [id number] Yes TAKE 1 TABLET BY MOUTH EVERY DAY Lew Worthy MD  Active   apixaban (Eliquis) 5 mg tablet [id number] Yes Take 1 tablet (5 mg) by mouth 2 times a day. Lew Worthy MD  Active   dextromethorphan-bupropion (Auvelity)  mg tablet, IR and ER, biphasic [id number] Yes Take 1 tablet by mouth once daily.   Patient taking differently: Take 1 tablet by mouth once daily. As needed    Bassem Landaverde DO  Active   donepezil (Aricept) 5 mg tablet [id number] Yes TAKE 2 TABLETS (10 MG) BY MOUTH ONCE DAILY AT BEDTIME. Bassem Landaverde DO  Active   ergocalciferol (Vitamin D-2) 1250 mcg (50,000 units) capsule [id number] Yes Take 1 capsule (1.25 mg) by mouth 1 (one) time per week. Bassem Landaverde DO  Active   FLUoxetine (PROzac) 20 mg capsule [id number] Yes START WITH 1 CAPSULE DAILY FOR 2 WEEKS, AND THEN CAN INCREASE TO 2 CAPSULES DAILY. Bassem Landaverde DO  Active   levothyroxine (Synthroid, Levoxyl) 50 mcg tablet [id number] Yes Take 1 tablet (50 mcg) by mouth early in the morning.. Take on an empty stomach at the same time each day, either 30 to 60 minutes prior to breakfast Bassem Landaverde DO  Active     Discontinued 06/17/25 1203                    DISEASE MANAGEMENT ASSESSMENT:     ANTICOAGULATION ASSESSMENT    DIAGNOSIS: prevention of nonvalvular atrial fibrilliation stroke and systemic embolism  - Patient is projected to be on anticoagulation long term    The ASCVD Risk score (Yoan SAM, et al., 2019) failed to calculate for the following reasons:    The 2019 ASCVD risk score is only valid for ages 40 to 79    ABBEY VASC SCORING CALCULATOR:   [id number] Stroke Risk Points: 7   Values used to calculate this score:    Points  Metrics       0        Has Congestive Heart Failure: No       1        Has Hypertension: Yes       2        Age: 80       0        Has Diabetes: No       2        Had Stroke: Yes                 Had TIA: No                  Had Thromboembolism: No       1        Has Vascular Disease: Yes       1        Clinically Relevant Sex: Female    Lip GH, et al. 2009. © 2010 American College of Chest Physicians     ABBEY VASC SCORING MANUAL:   - [id number] Score: [4] (only included if diagnosis is atrial fibrillation)   Age: [<65 (0)] [65-74 (+1)] [> 75 (+2)]: 2  Sex: [Male/Female (+1)]: 1  CHF history: [No/Yes(+1)]: 0  Hypertension history: [No/Yes(+1)]: 1  Stroke/TIA/thromboembolism history: [No/Yes(+2)]: 0  Vascular disease history (prior MI, peripheral artery disease, aortic plaque): [No/Yes(+1)]: 0  Diabetes history: [No/Yes(+1)]: 0    CURRENT PHARMACOTHERAPY:   Eliquis 5 mg BID  79 y/o  61.2 kg  Scr 0.95 mg/dL (4/14/2025)    RELEVANT PAST MEDICAL HISTORY:   A-fib, HTN, CAD, DLD    Affordability/Accessibility: Eliquis creates burden for patient  Adherence/Organization: daughter confirms pt is taking Eliquis twice daily (12 hours apart); patient's son assists with medication administration  Adverse Reactions: no abnormal bruising or bleeding; no dark/tarry stools. Daughter calls her to ask this daily.  Recent Hospitalizations: none  Recent Falls/Trauma: none  Changes in Tobacco or Alcohol Intake:   Tobacco: smokes 1 pack of cigarettes per day  Alcohol: none, does not use    EDUCATION/COUNSELING:   - Counseled patient on MOA, expectations, duration of therapy, contraindications, administration, and monitoring parameters  - Counseled patient of side effects that are indicative of bleeding such as dark tarry stool, unexplainable bruising, or vomiting up a coffee ground like substance    [facility] Patient Assistance Program (PAP)    Application for program to be submitted for the following medications: Eliquis    Prescription Insurance:  Yes   Paid Test Claim:  Other: RTS 7/5/2025   County of Permanent Address:  [facility]    Members of Household:  1   Files Taxes:  No - SSB and pensions (3)     Patient will be email financial  information to pharmacist directly at [email or web address].    Patient verbally reports monthly or yearly income which is less than 400% federal poverty level    Patient aware this process may take up to 6 weeks.     If approved medication must be filled through [facility] and MEDICATION WILL BE MAILED TO PATIENT.       DISCUSSION/NOTES:   Today's appointment was initial visit to establish care with Clinical Pharmacy. Appointment was completed with the patient's daughter, Patricia.  She reports no abnormal bruising or bleeding with Eliquis. States she calls Harper every day to confirm she is having no problems with anticoagulation. Patient's son lives with her and manages her medications.   Will apply for  PAP for cost assistance and follow up in 1 month. Of note, Eliquis is too soon to refill through insurance until 7/5/2025. Patient's daughter is aware we will likely not hear back on program approval until that time.    ASSESSMENT:    Assessment/Plan   Problem List Items Addressed This Visit       Paroxysmal atrial fibrillation (Multi)    Harper continues on anticoagulation with Eliquis. No dosage adjustment required for age, weight, and renal function.         Relevant Orders    Referral to Clinical Pharmacy       RECOMMENDATIONS/PLAN:    Continue: Eliquis 5 mg BID  Follow up: 1 month    Last Appnt with Referring Provider: 6/17/2025  Next Appnt with Referring Provider: 9/3/2025  Clinical Pharmacist follow up: 7/17/2025  VAF/Application Expiration: Yes    Date: Pending  Type of Encounter: Adilene Negro, PharmD    Verbal consent to manage patient's drug therapy was obtained from an individual authorized to act on behalf of a patient. They were informed they may decline to participate or withdraw from participation in pharmacy services at any time.    Continue all meds under the continuation of care with the referring provider and clinical pharmacy team.            [1] No Known  Allergies  [2]   Past Medical History:  Diagnosis Date    Chronic obstructive pulmonary disease with (acute) exacerbation (Multi)     Bronchitis, chronic obstructive, with exacerbation    Contusion of left knee, initial encounter     Contusion of left knee    Contusion of unspecified lower leg, initial encounter     Traumatic hematoma of lower leg    Encounter for general adult medical examination without abnormal findings 07/28/2021    Medicare annual wellness visit, subsequent    Encounter for screening mammogram for malignant neoplasm of breast     Visit for screening mammogram    Generalized anxiety disorder     Anxiety, generalized    Intervertebral disc disorders with radiculopathy, lumbar region     Lumbar disc herniation with radiculopathy    Malignant neoplasm of colon, unspecified     Cancer, colon    Overweight     Overweight (BMI 25.0-29.9)    Personal history of other diseases of the circulatory system     History of mitral valve insufficiency    Personal history of other diseases of the circulatory system     History of mitral valve prolapse    Personal history of other diseases of the digestive system     History of irritable bowel syndrome    Personal history of other diseases of the musculoskeletal system and connective tissue     History of back pain    Personal history of other diseases of the respiratory system     History of chronic obstructive lung disease    Personal history of other diseases of the respiratory system     History of sinusitis    Personal history of other endocrine, nutritional and metabolic disease     History of hyperlipidemia    Personal history of other mental and behavioral disorders     History of panic attacks    Personal history of other specified conditions     History of ulcer disease    Personal history of other specified conditions 04/10/2018    History of diarrhea    Personal history of other specified conditions     History of chest pain    Personal history of  other specified conditions     History of precordial chest pain    Personal history of other specified conditions     History of palpitations   [3]   Current Outpatient Medications on File Prior to Visit   Medication Sig Dispense Refill    ALPRAZolam (Xanax) 0.25 mg tablet TAKE 1 TABLET BY MOUTH EVERY 8 to  12 hrs  AS NEEDED FOR PANIC ATTACKS 60 tablet 3    amiodarone (Pacerone) 200 mg tablet TAKE 1 TABLET BY MOUTH EVERY DAY 90 tablet 3    apixaban (Eliquis) 5 mg tablet Take 1 tablet (5 mg) by mouth 2 times a day. 60 tablet 11    dextromethorphan-bupropion (Auvelity)  mg tablet, IR and ER, biphasic Take 1 tablet by mouth once daily. (Patient taking differently: Take 1 tablet by mouth once daily. As needed) 90 tablet 3    donepezil (Aricept) 5 mg tablet TAKE 2 TABLETS (10 MG) BY MOUTH ONCE DAILY AT BEDTIME. 180 tablet 3    ergocalciferol (Vitamin D-2) 1250 mcg (50,000 units) capsule Take 1 capsule (1.25 mg) by mouth 1 (one) time per week. 4 capsule 1    FLUoxetine (PROzac) 20 mg capsule START WITH 1 CAPSULE DAILY FOR 2 WEEKS, AND THEN CAN INCREASE TO 2 CAPSULES DAILY. 180 capsule 1    levothyroxine (Synthroid, Levoxyl) 50 mcg tablet Take 1 tablet (50 mcg) by mouth early in the morning.. Take on an empty stomach at the same time each day, either 30 to 60 minutes prior to breakfast 90 tablet 3    [DISCONTINUED] metoprolol succinate XL (Toprol-XL) 25 mg 24 hr tablet TAKE 1 TABLET (25 MG) BY MOUTH DAILY DO NOT CRUSH OR CHEW 90 tablet 2     No current facility-administered medications on file prior to visit.

## 2025-06-22 DIAGNOSIS — E55.9 VITAMIN D DEFICIENCY: ICD-10-CM

## 2025-06-23 RX ORDER — ERGOCALCIFEROL 1.25 MG/1
1.25 CAPSULE ORAL
Qty: 4 CAPSULE | Refills: 1 | Status: SHIPPED | OUTPATIENT
Start: 2025-06-29

## 2025-06-25 ENCOUNTER — TELEPHONE (OUTPATIENT)
Dept: PHARMACY | Facility: HOSPITAL | Age: 80
End: 2025-06-25
Payer: MEDICARE

## 2025-06-25 NOTE — TELEPHONE ENCOUNTER
Patient Assistance Program Approval:     We are pleased to inform you that your application for assistance has been approved.     This approval is valid through 6/24/2026 as long as the following criteria continue to be satisfied:     Your medication (Eliquis) remains covered under your current insurance plan.   Your prescriber does not discontinue therapy.   You do not seek reimbursement from any other private or government-funded programs for the  medication.    Under this program, the pharmacy will first bill your insurance plan for your indemnified specified medication. The [employer] Assistance Fund will then offset your copay balance, so that your out-of pocket expense for your specialty medication will be $0.00.    Left voicemail for patient's daughter, Patricia, with the above information.     Norma Negro, PharmD

## 2025-07-07 PROCEDURE — RXMED WILLOW AMBULATORY MEDICATION CHARGE

## 2025-07-08 ENCOUNTER — PHARMACY VISIT (OUTPATIENT)
Dept: PHARMACY | Facility: CLINIC | Age: 80
End: 2025-07-08
Payer: MEDICARE

## 2025-07-16 NOTE — PROGRESS NOTES
"  Pharmacist Clinic: Cardiology Management    Harper Diana is a 80 y.o. female was referred to Clinical Pharmacy Team for anticoagulation management.     Referring Provider: Lew Worthy MD    THIS IS A FOLLOW UP PATIENT APPOINTMENT. AT LAST VISIT ON 6/18/2025 WITH PHARMACIST (Norma Negro).    Appointment was completed by the patient's daughter, Patricia, who was reached at .    REVIEW OF PAST APPNT (IF APPLICABLE):   Today's appointment was initial visit to establish care with Clinical Pharmacy. Appointment was completed with the patient's daughter, Patricia.  She reports no abnormal bruising or bleeding with Eliquis. States she calls Harper every day to confirm she is having no problems with anticoagulation. Patient's son lives with her and manages her medications.   Will apply for  PAP for cost assistance and follow up in 1 month. Of note, Eliquis is too soon to refill through insurance until 7/5/2025. Patient's daughter is aware we will likely not hear back on program approval until that time.    Allergies[1]    Medical History[2]    Medications Ordered Prior to Encounter[3]      RELEVANT LAB RESULTS:  Lab Results   Component Value Date    BILITOT 0.8 04/14/2025    CALCIUM 9.2 04/14/2025    CO2 26 04/14/2025     04/14/2025    CREATININE 0.95 04/14/2025    GLUCOSE 73 04/14/2025    ALKPHOS 68 04/14/2025    K 4.2 04/14/2025    PROT 6.7 04/14/2025     04/14/2025    AST 16 04/14/2025    ALT 10 04/14/2025    BUN 15 04/14/2025    ANIONGAP 12 04/14/2025    ALBUMIN 4.5 04/14/2025    GFRF 78 06/20/2023     Lab Results   Component Value Date    TRIG 178 (H) 04/14/2025    CHOL 163 04/14/2025    LDLCALC 82 04/14/2025    HDL 54 04/14/2025     No results found for: \"BMCBC\", \"CBCDIF\"     PHARMACEUTICAL ASSESSMENT:    MEDICATION RECONCILIATION    Was a medication reconciliation completed at this visit? No  Home Pharmacy Reviewed? Yes, describe: [id number]    Added:  - None    Changed:  - " None    Removed:  - None    Drug Interactions? Yes, describe: Dextromethorphan component of Auvelity may enhance serotonergic effects of fluoxetine. Recommended to use low starting doses of dextromethorphan and monitor for toxicities.     Medication Documentation Review Audit       Reviewed by Norma Negro, CarltonD (Pharmacist) on 06/18/25 at 0844      Medication Order Taking? Sig Documenting Provider Last Dose Status   ALPRAZolam (Xanax) 0.25 mg tablet [id number] Yes TAKE 1 TABLET BY MOUTH EVERY 8 to  12 hrs  AS NEEDED FOR PANIC ATTACKS Bassem Landaverde DO  Active   amiodarone (Pacerone) 200 mg tablet [id number] Yes TAKE 1 TABLET BY MOUTH EVERY DAY Lew Worthy MD  Active   apixaban (Eliquis) 5 mg tablet [id number] Yes Take 1 tablet (5 mg) by mouth 2 times a day. Lew Worthy MD  Active   dextromethorphan-bupropion (Auvelity)  mg tablet, IR and ER, biphasic [id number] Yes Take 1 tablet by mouth once daily.   Patient taking differently: Take 1 tablet by mouth once daily. As needed    Bassem Landaverde DO  Active   donepezil (Aricept) 5 mg tablet [id number] Yes TAKE 2 TABLETS (10 MG) BY MOUTH ONCE DAILY AT BEDTIME. Bassem Landaverde DO  Active   ergocalciferol (Vitamin D-2) 1250 mcg (50,000 units) capsule [id number] Yes Take 1 capsule (1.25 mg) by mouth 1 (one) time per week. Bassem Landaverde DO  Active   FLUoxetine (PROzac) 20 mg capsule [id number] Yes START WITH 1 CAPSULE DAILY FOR 2 WEEKS, AND THEN CAN INCREASE TO 2 CAPSULES DAILY. Bassem Landaverde DO  Active   levothyroxine (Synthroid, Levoxyl) 50 mcg tablet [id number] Yes Take 1 tablet (50 mcg) by mouth early in the morning.. Take on an empty stomach at the same time each day, either 30 to 60 minutes prior to breakfast Bassem Landaverde DO  Active     Discontinued 06/17/25 1203                    DISEASE MANAGEMENT ASSESSMENT:     ANTICOAGULATION ASSESSMENT    DIAGNOSIS: prevention of nonvalvular atrial fibrilliation stroke and systemic  embolism  - Patient is projected to be on anticoagulation long term    The ASCVD Risk score (Yoan SAM, et al., 2019) failed to calculate for the following reasons:    The 2019 ASCVD risk score is only valid for ages 40 to 79    ABBEY VASC SCORING CALCULATOR:   [id number] Stroke Risk Points: 7   Values used to calculate this score:    Points  Metrics       0        Has Congestive Heart Failure: No       1        Has Hypertension: Yes       2        Age: 80       0        Has Diabetes: No       2        Had Stroke: Yes                 Had TIA: No                 Had Thromboembolism: No       1        Has Vascular Disease: Yes       1        Clinically Relevant Sex: Female    Lip GH, et al. 2009. © 2010 [employer]     ABBEY VASC SCORING MANUAL:   - [id number] Score: [4] (only included if diagnosis is atrial fibrillation)   Age: [<65 (0)] [65-74 (+1)] [> 75 (+2)]: 2  Sex: [Male/Female (+1)]: 1  CHF history: [No/Yes(+1)]: 0  Hypertension history: [No/Yes(+1)]: 1  Stroke/TIA/thromboembolism history: [No/Yes(+2)]: 0  Vascular disease history (prior MI, peripheral artery disease, aortic plaque): [No/Yes(+1)]: 0  Diabetes history: [No/Yes(+1)]: 0    CURRENT PHARMACOTHERAPY:   Eliquis 5 mg BID  81 y/o  61.2 kg  Scr 0.95 mg/dL (4/14/2025)    RELEVANT PAST MEDICAL HISTORY:   A-fib, HTN, CAD, DLD    Affordability/Accessibility:  PAP active through 6/24/2026  Adherence/Organization: daughter confirms pt is taking Eliquis twice daily (12 hours apart); patient's son assists with medication administration  Adverse Reactions: no abnormal bruising or bleeding; no dark/tarry stools or blood in the toilet bowl. Daughter calls her to ask this daily.  Recent Hospitalizations: none  Recent Falls/Trauma: none, has some scratches from gardening but these heal quickly  Changes in Tobacco or Alcohol Intake:   Tobacco: smokes 1 pack of cigarettes per day  Alcohol: none, does not use    EDUCATION/COUNSELING:   -  Counseled patient on MOA, expectations, duration of therapy, contraindications, administration, and monitoring parameters  - Counseled patient of side effects that are indicative of bleeding such as dark tarry stool, unexplainable bruising, or vomiting up a coffee ground like substance    DISCUSSION/NOTES:   Today's appointment was 1 month follow up. Appointment was completed by the patient's daughter, Patricia.  She reports no abnormal bruising or bleeding with Eliquis. She calls to ask Harper about changes in the color of her stool or signs of bleeding each morning.  She has noted increased nightmares recently, daughter will discuss with her PCP.   Harper is still smoking about 1 pack of cigarettes per day, however Patricia states they are looking into alternative smoking cessation methods such as acupuncture.  Will follow up in 10 months for  PAP renewal.     ASSESSMENT:    Assessment/Plan   Problem List Items Addressed This Visit       Paroxysmal atrial fibrillation (Multi) - Primary    Harper continues on anticoagulation with Eliquis. No dosage adjustment required for age, weight, and renal function.         Relevant Orders    Referral to Clinical Pharmacy     RECOMMENDATIONS/PLAN:    Continue: Eliquis 5 mg BID  Follow up: 10 months    Last Appnt with Referring Provider: 6/17/2025  Next Appnt with Referring Provider: 9/3/2025  Clinical Pharmacist follow up: 5/28/2026  VAF/Application Expiration: Yes    Date: 6/24/2026  Type of Encounter: Adilene Negro PharmD    Verbal consent to manage patient's drug therapy was obtained from an individual authorized to act on behalf of a patient. They were informed they may decline to participate or withdraw from participation in pharmacy services at any time.    Continue all meds under the continuation of care with the referring provider and clinical pharmacy team.            [1] No Known Allergies  [2]   Past Medical History:  Diagnosis Date    Chronic obstructive  pulmonary disease with (acute) exacerbation (Multi)     Bronchitis, chronic obstructive, with exacerbation    Contusion of left knee, initial encounter     Contusion of left knee    Contusion of unspecified lower leg, initial encounter     Traumatic hematoma of lower leg    Encounter for general adult medical examination without abnormal findings 07/28/2021    Medicare annual wellness visit, subsequent    Encounter for screening mammogram for malignant neoplasm of breast     Visit for screening mammogram    Generalized anxiety disorder     Anxiety, generalized    Intervertebral disc disorders with radiculopathy, lumbar region     Lumbar disc herniation with radiculopathy    Malignant neoplasm of colon, unspecified     Cancer, colon    Overweight     Overweight (BMI 25.0-29.9)    Personal history of other diseases of the circulatory system     History of mitral valve insufficiency    Personal history of other diseases of the circulatory system     History of mitral valve prolapse    Personal history of other diseases of the digestive system     History of irritable bowel syndrome    Personal history of other diseases of the musculoskeletal system and connective tissue     History of back pain    Personal history of other diseases of the respiratory system     History of chronic obstructive lung disease    Personal history of other diseases of the respiratory system     History of sinusitis    Personal history of other endocrine, nutritional and metabolic disease     History of hyperlipidemia    Personal history of other mental and behavioral disorders     History of panic attacks    Personal history of other specified conditions     History of ulcer disease    Personal history of other specified conditions 04/10/2018    History of diarrhea    Personal history of other specified conditions     History of chest pain    Personal history of other specified conditions     History of precordial chest pain    Personal  history of other specified conditions     History of palpitations   [3]   Current Outpatient Medications on File Prior to Visit   Medication Sig Dispense Refill    ALPRAZolam (Xanax) 0.25 mg tablet TAKE 1 TABLET BY MOUTH EVERY 8 to  12 hrs  AS NEEDED FOR PANIC ATTACKS 60 tablet 3    amiodarone (Pacerone) 200 mg tablet TAKE 1 TABLET BY MOUTH EVERY DAY 90 tablet 3    apixaban (Eliquis) 5 mg tablet Take 1 tablet (5 mg) by mouth 2 times a day. 180 tablet 3    dextromethorphan-bupropion (Auvelity)  mg tablet, IR and ER, biphasic Take 1 tablet by mouth once daily. (Patient taking differently: Take 1 tablet by mouth once daily. As needed) 90 tablet 3    donepezil (Aricept) 5 mg tablet TAKE 2 TABLETS (10 MG) BY MOUTH ONCE DAILY AT BEDTIME. 180 tablet 3    ergocalciferol (Vitamin D-2) 1250 mcg (50,000 units) capsule TAKE 1 CAPSULE BY MOUTH ONE TIME PER WEEK 4 capsule 1    FLUoxetine (PROzac) 20 mg capsule START WITH 1 CAPSULE DAILY FOR 2 WEEKS, AND THEN CAN INCREASE TO 2 CAPSULES DAILY. 180 capsule 1    levothyroxine (Synthroid, Levoxyl) 50 mcg tablet Take 1 tablet (50 mcg) by mouth early in the morning.. Take on an empty stomach at the same time each day, either 30 to 60 minutes prior to breakfast 90 tablet 3     No current facility-administered medications on file prior to visit.

## 2025-07-17 ENCOUNTER — APPOINTMENT (OUTPATIENT)
Dept: PHARMACY | Facility: HOSPITAL | Age: 80
End: 2025-07-17
Payer: MEDICARE

## 2025-07-17 DIAGNOSIS — I48.0 PAROXYSMAL ATRIAL FIBRILLATION (MULTI): Primary | ICD-10-CM

## 2025-07-17 NOTE — Clinical Note
Florencia Worthy, Today's appointment was 1 month follow up. Appointment was completed by the patient's daughter, Patricia. She reports no abnormal bruising or bleeding with Eliquis. She calls to ask Harper about changes in the color of her stool or signs of bleeding each morning. No medication changes today, will follow up in 10 months for  PAP renewal.

## 2025-08-02 DIAGNOSIS — F32.A ANXIETY AND DEPRESSION: ICD-10-CM

## 2025-08-02 DIAGNOSIS — F41.9 ANXIETY AND DEPRESSION: ICD-10-CM

## 2025-08-04 RX ORDER — ALPRAZOLAM 0.25 MG/1
TABLET ORAL
Qty: 60 TABLET | Refills: 1 | Status: SHIPPED | OUTPATIENT
Start: 2025-08-04

## 2025-08-08 ENCOUNTER — APPOINTMENT (OUTPATIENT)
Dept: CARDIOLOGY | Facility: CLINIC | Age: 80
End: 2025-08-08
Payer: MEDICARE

## 2025-08-14 ENCOUNTER — APPOINTMENT (OUTPATIENT)
Dept: PODIATRY | Facility: CLINIC | Age: 80
End: 2025-08-14
Payer: MEDICARE

## 2025-08-15 ENCOUNTER — OFFICE VISIT (OUTPATIENT)
Dept: CARDIOLOGY | Facility: CLINIC | Age: 80
End: 2025-08-15
Payer: MEDICARE

## 2025-08-15 VITALS
WEIGHT: 130 LBS | OXYGEN SATURATION: 97 % | SYSTOLIC BLOOD PRESSURE: 110 MMHG | DIASTOLIC BLOOD PRESSURE: 70 MMHG | HEART RATE: 67 BPM | BODY MASS INDEX: 23.04 KG/M2 | HEIGHT: 63 IN

## 2025-08-15 DIAGNOSIS — I48.0 PAROXYSMAL ATRIAL FIBRILLATION (MULTI): ICD-10-CM

## 2025-08-15 LAB
ATRIAL RATE: 88 BPM
P AXIS: 80 DEGREES
P OFFSET: 192 MS
P ONSET: 141 MS
PR INTERVAL: 170 MS
Q ONSET: 226 MS
QRS COUNT: 15 BEATS
QRS DURATION: 74 MS
QT INTERVAL: 400 MS
QTC CALCULATION(BAZETT): 484 MS
QTC FREDERICIA: 454 MS
R AXIS: 12 DEGREES
T AXIS: 75 DEGREES
T OFFSET: 426 MS
VENTRICULAR RATE: 88 BPM

## 2025-08-15 PROCEDURE — 3074F SYST BP LT 130 MM HG: CPT | Performed by: INTERNAL MEDICINE

## 2025-08-15 PROCEDURE — 99212 OFFICE O/P EST SF 10 MIN: CPT

## 2025-08-15 PROCEDURE — 1159F MED LIST DOCD IN RCRD: CPT | Performed by: INTERNAL MEDICINE

## 2025-08-15 PROCEDURE — 99205 OFFICE O/P NEW HI 60 MIN: CPT | Performed by: INTERNAL MEDICINE

## 2025-08-15 PROCEDURE — 3078F DIAST BP <80 MM HG: CPT | Performed by: INTERNAL MEDICINE

## 2025-08-15 PROCEDURE — 93005 ELECTROCARDIOGRAM TRACING: CPT | Performed by: INTERNAL MEDICINE

## 2025-08-17 ENCOUNTER — PATIENT MESSAGE (OUTPATIENT)
Dept: PRIMARY CARE | Facility: CLINIC | Age: 80
End: 2025-08-17
Payer: MEDICARE

## 2025-08-19 ENCOUNTER — HOSPITAL ENCOUNTER (INPATIENT)
Facility: HOSPITAL | Age: 80
LOS: 1 days | Discharge: HOME HEALTH | DRG: 605 | End: 2025-08-20
Attending: EMERGENCY MEDICINE | Admitting: SURGERY
Payer: MEDICARE

## 2025-08-19 ENCOUNTER — APPOINTMENT (OUTPATIENT)
Dept: RADIOLOGY | Facility: HOSPITAL | Age: 80
DRG: 605 | End: 2025-08-19
Payer: MEDICARE
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
## 2025-08-19 ASSESSMENT — LIFESTYLE VARIABLES
TOTAL SCORE: 0
EVER FELT BAD OR GUILTY ABOUT YOUR DRINKING: NO
HAVE PEOPLE ANNOYED YOU BY CRITICIZING YOUR DRINKING: NO
HAVE YOU EVER FELT YOU SHOULD CUT DOWN ON YOUR DRINKING: NO
EVER HAD A DRINK FIRST THING IN THE MORNING TO STEADY YOUR NERVES TO GET RID OF A HANGOVER: NO

## 2025-08-19 ASSESSMENT — PAIN DESCRIPTION - ONSET: ONSET: ONGOING

## 2025-08-19 ASSESSMENT — PAIN DESCRIPTION - PAIN TYPE: TYPE: ACUTE PAIN

## 2025-08-19 ASSESSMENT — PAIN - FUNCTIONAL ASSESSMENT: PAIN_FUNCTIONAL_ASSESSMENT: 0-10

## 2025-08-19 ASSESSMENT — PAIN SCALES - GENERAL: PAINLEVEL_OUTOF10: 7

## 2025-08-19 ASSESSMENT — PAIN DESCRIPTION - DESCRIPTORS: DESCRIPTORS: ACHING

## 2025-08-19 ASSESSMENT — PAIN DESCRIPTION - LOCATION: LOCATION: ABDOMEN

## 2025-08-19 ASSESSMENT — PAIN DESCRIPTION - ORIENTATION: ORIENTATION: MID

## 2025-08-20 ENCOUNTER — PHARMACY VISIT (OUTPATIENT)
Dept: PHARMACY | Facility: CLINIC | Age: 80
End: 2025-08-20
Payer: MEDICARE
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
## 2025-08-20 PROCEDURE — RXMED WILLOW AMBULATORY MEDICATION CHARGE

## 2025-08-20 SDOH — SOCIAL STABILITY: SOCIAL INSECURITY
WITHIN THE LAST YEAR, HAVE YOU BEEN RAPED OR FORCED TO HAVE ANY KIND OF SEXUAL ACTIVITY BY YOUR PARTNER OR EX-PARTNER?: NO

## 2025-08-20 SDOH — ECONOMIC STABILITY: HOUSING INSECURITY: AT ANY TIME IN THE PAST 12 MONTHS, WERE YOU HOMELESS OR LIVING IN A SHELTER (INCLUDING NOW)?: NO

## 2025-08-20 SDOH — SOCIAL STABILITY: SOCIAL INSECURITY
WITHIN THE LAST YEAR, HAVE YOU BEEN KICKED, HIT, SLAPPED, OR OTHERWISE PHYSICALLY HURT BY YOUR PARTNER OR EX-PARTNER?: NO

## 2025-08-20 SDOH — SOCIAL STABILITY: SOCIAL INSECURITY: WITHIN THE LAST YEAR, HAVE YOU BEEN AFRAID OF YOUR PARTNER OR EX-PARTNER?: NO

## 2025-08-20 SDOH — SOCIAL STABILITY: SOCIAL INSECURITY: ARE THERE ANY APPARENT SIGNS OF INJURIES/BEHAVIORS THAT COULD BE RELATED TO ABUSE/NEGLECT?: NO

## 2025-08-20 SDOH — SOCIAL STABILITY: SOCIAL INSECURITY: DO YOU FEEL UNSAFE GOING BACK TO THE PLACE WHERE YOU ARE LIVING?: NO

## 2025-08-20 SDOH — HEALTH STABILITY: PHYSICAL HEALTH: ON AVERAGE, HOW MANY DAYS PER WEEK DO YOU ENGAGE IN MODERATE TO STRENUOUS EXERCISE (LIKE A BRISK WALK)?: 0 DAYS

## 2025-08-20 SDOH — ECONOMIC STABILITY: FOOD INSECURITY: WITHIN THE PAST 12 MONTHS, THE FOOD YOU BOUGHT JUST DIDN'T LAST AND YOU DIDN'T HAVE MONEY TO GET MORE.: NEVER TRUE

## 2025-08-20 SDOH — ECONOMIC STABILITY: FOOD INSECURITY: HOW HARD IS IT FOR YOU TO PAY FOR THE VERY BASICS LIKE FOOD, HOUSING, MEDICAL CARE, AND HEATING?: NOT VERY HARD

## 2025-08-20 SDOH — SOCIAL STABILITY: SOCIAL INSECURITY: HAVE YOU HAD ANY THOUGHTS OF HARMING ANYONE ELSE?: NO

## 2025-08-20 SDOH — ECONOMIC STABILITY: FOOD INSECURITY: WITHIN THE PAST 12 MONTHS, YOU WORRIED THAT YOUR FOOD WOULD RUN OUT BEFORE YOU GOT THE MONEY TO BUY MORE.: NEVER TRUE

## 2025-08-20 SDOH — SOCIAL STABILITY: SOCIAL INSECURITY: HAVE YOU HAD THOUGHTS OF HARMING ANYONE ELSE?: NO

## 2025-08-20 SDOH — ECONOMIC STABILITY: HOUSING INSECURITY: IN THE LAST 12 MONTHS, WAS THERE A TIME WHEN YOU WERE NOT ABLE TO PAY THE MORTGAGE OR RENT ON TIME?: NO

## 2025-08-20 SDOH — ECONOMIC STABILITY: HOUSING INSECURITY: IN THE PAST 12 MONTHS, HOW MANY TIMES HAVE YOU MOVED WHERE YOU WERE LIVING?: 0

## 2025-08-20 SDOH — SOCIAL STABILITY: SOCIAL INSECURITY: WITHIN THE LAST YEAR, HAVE YOU BEEN HUMILIATED OR EMOTIONALLY ABUSED IN OTHER WAYS BY YOUR PARTNER OR EX-PARTNER?: NO

## 2025-08-20 SDOH — SOCIAL STABILITY: SOCIAL INSECURITY: WERE YOU ABLE TO COMPLETE ALL THE BEHAVIORAL HEALTH SCREENINGS?: YES

## 2025-08-20 SDOH — ECONOMIC STABILITY: INCOME INSECURITY: IN THE PAST 12 MONTHS HAS THE ELECTRIC, GAS, OIL, OR WATER COMPANY THREATENED TO SHUT OFF SERVICES IN YOUR HOME?: NO

## 2025-08-20 SDOH — SOCIAL STABILITY: SOCIAL INSECURITY: DOES ANYONE TRY TO KEEP YOU FROM HAVING/CONTACTING OTHER FRIENDS OR DOING THINGS OUTSIDE YOUR HOME?: NO

## 2025-08-20 SDOH — SOCIAL STABILITY: SOCIAL INSECURITY: DO YOU FEEL ANYONE HAS EXPLOITED OR TAKEN ADVANTAGE OF YOU FINANCIALLY OR OF YOUR PERSONAL PROPERTY?: NO

## 2025-08-20 SDOH — SOCIAL STABILITY: SOCIAL INSECURITY: HAS ANYONE EVER THREATENED TO HURT YOUR FAMILY OR YOUR PETS?: NO

## 2025-08-20 SDOH — SOCIAL STABILITY: SOCIAL INSECURITY: ARE YOU OR HAVE YOU BEEN THREATENED OR ABUSED PHYSICALLY, EMOTIONALLY, OR SEXUALLY BY ANYONE?: NO

## 2025-08-20 SDOH — HEALTH STABILITY: PHYSICAL HEALTH: ON AVERAGE, HOW MANY MINUTES DO YOU ENGAGE IN EXERCISE AT THIS LEVEL?: 0 MIN

## 2025-08-20 SDOH — SOCIAL STABILITY: SOCIAL INSECURITY: ABUSE: ADULT

## 2025-08-20 SDOH — ECONOMIC STABILITY: TRANSPORTATION INSECURITY: IN THE PAST 12 MONTHS, HAS LACK OF TRANSPORTATION KEPT YOU FROM MEDICAL APPOINTMENTS OR FROM GETTING MEDICATIONS?: NO

## 2025-08-20 ASSESSMENT — COGNITIVE AND FUNCTIONAL STATUS - GENERAL
DAILY ACTIVITIY SCORE: 24
MOVING TO AND FROM BED TO CHAIR: A LITTLE
CLIMB 3 TO 5 STEPS WITH RAILING: A LITTLE
WALKING IN HOSPITAL ROOM: A LITTLE
MOBILITY SCORE: 20
DRESSING REGULAR LOWER BODY CLOTHING: A LITTLE
HELP NEEDED FOR BATHING: A LITTLE
DRESSING REGULAR LOWER BODY CLOTHING: A LITTLE
EATING MEALS: A LITTLE
MOBILITY SCORE: 24
PATIENT BASELINE BEDBOUND: NO
STANDING UP FROM CHAIR USING ARMS: A LITTLE
DRESSING REGULAR UPPER BODY CLOTHING: A LITTLE
TOILETING: A LITTLE
HELP NEEDED FOR BATHING: A LITTLE
PERSONAL GROOMING: A LITTLE
MOBILITY SCORE: 24
PERSONAL GROOMING: A LITTLE
TOILETING: A LITTLE
DAILY ACTIVITIY SCORE: 19
DRESSING REGULAR UPPER BODY CLOTHING: A LITTLE
DAILY ACTIVITIY SCORE: 18

## 2025-08-20 ASSESSMENT — PAIN SCALES - GENERAL
PAINLEVEL_OUTOF10: 0 - NO PAIN
PAINLEVEL_OUTOF10: 7
PAINLEVEL_OUTOF10: 6
PAINLEVEL_OUTOF10: 8
PAINLEVEL_OUTOF10: 0 - NO PAIN
PAINLEVEL_OUTOF10: 3
PAINLEVEL_OUTOF10: 0 - NO PAIN

## 2025-08-20 ASSESSMENT — ACTIVITIES OF DAILY LIVING (ADL)
PATIENT'S MEMORY ADEQUATE TO SAFELY COMPLETE DAILY ACTIVITIES?: YES
LACK_OF_TRANSPORTATION: NO
ASSISTIVE_DEVICE: DENTURES LOWER;DENTURES UPPER
TOILETING: INDEPENDENT
HEARING - LEFT EAR: FUNCTIONAL
JUDGMENT_ADEQUATE_SAFELY_COMPLETE_DAILY_ACTIVITIES: YES
WALKS IN HOME: INDEPENDENT
HEARING - RIGHT EAR: FUNCTIONAL
ADEQUATE_TO_COMPLETE_ADL: YES
FEEDING YOURSELF: INDEPENDENT
BATHING: INDEPENDENT
DRESSING YOURSELF: INDEPENDENT
LACK_OF_TRANSPORTATION: NO
GROOMING: INDEPENDENT
BATHING_ASSISTANCE: MINIMAL

## 2025-08-20 ASSESSMENT — PAIN - FUNCTIONAL ASSESSMENT
PAIN_FUNCTIONAL_ASSESSMENT: 0-10

## 2025-08-20 ASSESSMENT — LIFESTYLE VARIABLES
AUDIT-C TOTAL SCORE: 0
HOW OFTEN DO YOU HAVE 6 OR MORE DRINKS ON ONE OCCASION: NEVER
SKIP TO QUESTIONS 9-10: 1
HOW MANY STANDARD DRINKS CONTAINING ALCOHOL DO YOU HAVE ON A TYPICAL DAY: PATIENT DOES NOT DRINK
HOW OFTEN DO YOU HAVE A DRINK CONTAINING ALCOHOL: NEVER
AUDIT-C TOTAL SCORE: 0

## 2025-08-20 ASSESSMENT — PAIN DESCRIPTION - DESCRIPTORS
DESCRIPTORS: DISCOMFORT
DESCRIPTORS: DISCOMFORT

## 2025-08-20 ASSESSMENT — PATIENT HEALTH QUESTIONNAIRE - PHQ9
SUM OF ALL RESPONSES TO PHQ9 QUESTIONS 1 & 2: 1
1. LITTLE INTEREST OR PLEASURE IN DOING THINGS: NOT AT ALL
2. FEELING DOWN, DEPRESSED OR HOPELESS: SEVERAL DAYS

## 2025-08-20 ASSESSMENT — PAIN DESCRIPTION - LOCATION
LOCATION: HEAD
LOCATION: HEAD

## 2025-08-21 ENCOUNTER — DOCUMENTATION (OUTPATIENT)
Dept: HOME HEALTH SERVICES | Facility: HOME HEALTH | Age: 80
End: 2025-08-21
Payer: MEDICARE

## 2025-08-22 ENCOUNTER — PATIENT OUTREACH (OUTPATIENT)
Dept: PRIMARY CARE | Facility: CLINIC | Age: 80
End: 2025-08-22
Payer: MEDICARE

## 2025-08-28 DIAGNOSIS — F32.A DEPRESSION, UNSPECIFIED: ICD-10-CM

## 2025-08-28 RX ORDER — FLUOXETINE 20 MG/1
40 CAPSULE ORAL
Qty: 180 CAPSULE | Refills: 3 | Status: SHIPPED | OUTPATIENT
Start: 2025-08-28

## 2025-09-02 ASSESSMENT — ENCOUNTER SYMPTOMS
DEPRESSION: 1
PALPITATIONS: 1

## 2025-09-03 ENCOUNTER — OFFICE VISIT (OUTPATIENT)
Dept: CARDIOLOGY | Facility: CLINIC | Age: 80
End: 2025-09-03
Payer: MEDICARE

## 2025-09-03 VITALS
HEIGHT: 64 IN | WEIGHT: 134.4 LBS | DIASTOLIC BLOOD PRESSURE: 80 MMHG | SYSTOLIC BLOOD PRESSURE: 126 MMHG | HEART RATE: 62 BPM | BODY MASS INDEX: 22.94 KG/M2 | OXYGEN SATURATION: 97 %

## 2025-09-03 DIAGNOSIS — I25.10 CORONARY ARTERY DISEASE INVOLVING NATIVE CORONARY ARTERY OF NATIVE HEART WITHOUT ANGINA PECTORIS: Primary | ICD-10-CM

## 2025-09-03 DIAGNOSIS — I48.0 PAROXYSMAL ATRIAL FIBRILLATION (MULTI): ICD-10-CM

## 2025-09-03 DIAGNOSIS — I10 PRIMARY HYPERTENSION: ICD-10-CM

## 2025-09-03 DIAGNOSIS — R06.02 SHORTNESS OF BREATH: ICD-10-CM

## 2025-09-03 DIAGNOSIS — R00.2 PALPITATIONS: ICD-10-CM

## 2025-09-03 DIAGNOSIS — I34.1 MITRAL VALVE PROLAPSE: ICD-10-CM

## 2025-09-03 DIAGNOSIS — E78.5 DYSLIPIDEMIA: ICD-10-CM

## 2025-09-03 DIAGNOSIS — I49.5 TACHY-BRADY SYNDROME (MULTI): ICD-10-CM

## 2025-09-03 PROCEDURE — 3074F SYST BP LT 130 MM HG: CPT | Performed by: INTERNAL MEDICINE

## 2025-09-03 PROCEDURE — 1111F DSCHRG MED/CURRENT MED MERGE: CPT | Performed by: INTERNAL MEDICINE

## 2025-09-03 PROCEDURE — 3079F DIAST BP 80-89 MM HG: CPT | Performed by: INTERNAL MEDICINE

## 2025-09-03 PROCEDURE — 99214 OFFICE O/P EST MOD 30 MIN: CPT | Performed by: INTERNAL MEDICINE

## 2025-09-03 PROCEDURE — 99212 OFFICE O/P EST SF 10 MIN: CPT

## 2025-09-03 PROCEDURE — 1159F MED LIST DOCD IN RCRD: CPT | Performed by: INTERNAL MEDICINE

## 2025-09-03 ASSESSMENT — ENCOUNTER SYMPTOMS: MEMORY LOSS: 1

## 2025-09-05 ENCOUNTER — PATIENT OUTREACH (OUTPATIENT)
Dept: PRIMARY CARE | Facility: CLINIC | Age: 80
End: 2025-09-05
Payer: MEDICARE

## 2026-05-28 ENCOUNTER — APPOINTMENT (OUTPATIENT)
Dept: PHARMACY | Facility: HOSPITAL | Age: 81
End: 2026-05-28
Payer: MEDICARE